# Patient Record
Sex: MALE | Race: WHITE
[De-identification: names, ages, dates, MRNs, and addresses within clinical notes are randomized per-mention and may not be internally consistent; named-entity substitution may affect disease eponyms.]

---

## 2017-01-08 ENCOUNTER — HOSPITAL ENCOUNTER (INPATIENT)
Dept: HOSPITAL 45 - C.EDB | Age: 22
LOS: 1 days | Discharge: HOME | DRG: 812 | End: 2017-01-09
Attending: HOSPITALIST | Admitting: HOSPITALIST
Payer: COMMERCIAL

## 2017-01-08 VITALS
SYSTOLIC BLOOD PRESSURE: 135 MMHG | OXYGEN SATURATION: 100 % | DIASTOLIC BLOOD PRESSURE: 82 MMHG | HEART RATE: 56 BPM | TEMPERATURE: 97.88 F

## 2017-01-08 VITALS
WEIGHT: 106.7 LBS | BODY MASS INDEX: 18.22 KG/M2 | WEIGHT: 106.7 LBS | HEIGHT: 64 IN | HEIGHT: 64 IN | BODY MASS INDEX: 18.22 KG/M2

## 2017-01-08 DIAGNOSIS — Z23: ICD-10-CM

## 2017-01-08 DIAGNOSIS — R10.12: ICD-10-CM

## 2017-01-08 DIAGNOSIS — D47.3: ICD-10-CM

## 2017-01-08 DIAGNOSIS — D57.00: Primary | ICD-10-CM

## 2017-01-08 DIAGNOSIS — R74.0: ICD-10-CM

## 2017-01-08 DIAGNOSIS — R94.5: ICD-10-CM

## 2017-01-08 DIAGNOSIS — E86.0: ICD-10-CM

## 2017-01-08 DIAGNOSIS — Z87.891: ICD-10-CM

## 2017-01-08 DIAGNOSIS — Z79.891: ICD-10-CM

## 2017-01-08 DIAGNOSIS — D72.829: ICD-10-CM

## 2017-01-08 DIAGNOSIS — D73.5: ICD-10-CM

## 2017-01-08 LAB
ALBUMIN/GLOB SERPL: 1 {RATIO} (ref 0.9–2)
ALP SERPL-CCNC: 100 U/L (ref 45–117)
ALT SERPL-CCNC: 64 U/L (ref 12–78)
ANION GAP SERPL CALC-SCNC: 10 MMOL/L (ref 3–11)
AST SERPL-CCNC: 115 U/L (ref 15–37)
BASOPHILS # BLD: 0.05 K/UL (ref 0–0.2)
BASOPHILS NFR BLD: 0.3 %
BUN SERPL-MCNC: 13 MG/DL (ref 7–18)
BUN/CREAT SERPL: 20.8 (ref 10–20)
CALCIUM SERPL-MCNC: 8.9 MG/DL (ref 8.5–10.1)
CHLORIDE SERPL-SCNC: 103 MMOL/L (ref 98–107)
CO2 SERPL-SCNC: 29 MMOL/L (ref 21–32)
COMPLETE: YES
CREAT CL PREDICTED SERPL C-G-VRATE: 122.9 ML/MIN
CREAT SERPL-MCNC: 0.61 MG/DL (ref 0.6–1.4)
EOSINOPHIL NFR BLD AUTO: 684 K/UL (ref 130–400)
GLOBULIN SER-MCNC: 4.1 GM/DL (ref 2.5–4)
GLUCOSE SERPL-MCNC: 92 MG/DL (ref 70–99)
HCT VFR BLD CALC: 30.5 % (ref 42–52)
HGB S BLD QL SOLY: (no result)
IG%: 0.4 %
IMM GRANULOCYTES NFR BLD AUTO: 9.7 %
LYMPHOCYTES # BLD: 1.55 K/UL (ref 1.2–3.4)
MCH RBC QN AUTO: 24.2 PG (ref 25–34)
MCHC RBC AUTO-ENTMCNC: 34.1 G/DL (ref 32–36)
MCV RBC AUTO: 70.9 FL (ref 80–100)
MICROCYTES BLD QL SMEAR: PRESENT
MONOCYTES NFR BLD: 8.9 %
NEUTROPHILS # BLD AUTO: 0.8 %
NEUTROPHILS NFR BLD AUTO: 79.9 %
PMV BLD AUTO: 9 FL (ref 7.4–10.4)
POLYCHROMASIA BLD QL SMEAR: (no result)
POTASSIUM SERPL-SCNC: 3.8 MMOL/L (ref 3.5–5.1)
RBC # BLD AUTO: 4.3 M/UL (ref 4.7–6.1)
SCHISTOCYTES BLD QL SMEAR: (no result)
SODIUM SERPL-SCNC: 142 MMOL/L (ref 136–145)
TARGETS BLD QL SMEAR: (no result)
WBC # BLD AUTO: 15.96 K/UL (ref 4.8–10.8)

## 2017-01-08 RX ADMIN — MORPHINE SULFATE PRN MG: 10 INJECTION, SOLUTION INTRAMUSCULAR; INTRAVENOUS at 23:23

## 2017-01-08 RX ADMIN — MORPHINE SULFATE PRN MG: 10 INJECTION, SOLUTION INTRAMUSCULAR; INTRAVENOUS at 19:12

## 2017-01-08 NOTE — HISTORY AND PHYSICAL
History & Physical


Date & Time of Service:


Jan 8, 2017 at 22:19


Chief Complaint:


Sickle Cell Crisis,Pain In Chest & Spleen


Primary Care Physician:


Jaime Lemons D.O.


History of Present Illness


Source:  patient, friend


20 y/o M Hx Sickle cell disease - recently D/Cd from a [place] hospital following 

a 5 day stay for painful crisis, anemia and what he was told was "splenic 

sequestration".  He was D/Cd in late December and then had visited an ER in 

[place] 01/02 with persistent pain.  He was D/Cd from the ER the same day.  His 

pain is mostly concentrated in his LUQ, is associated with nausea and worsened 

with deep inspiration.  A CT abdomen obtained in the ER reveals an expensively 

infarcted spleen.


The pt reports that he had been having QPM low-grade fevers while admitted in 

[place] but not since D/C.











We are unable to obtain records for the [facility] as the dept does not 

open on the lords day.





Past Medical/Surgical History


Medical Problems:


(1) Sickle cell anemia


Status: Chronic  











Family History





Diabetes mellitus


FH: heart disease


Hypertension


Kidney disease


Both parents with Sickle trait


Sibling with Sickle disease





Social History


Pt quit smoking 10/16


Does not drink alcohol


H[employer] from [employer] - studying engineering at [employer]


Smoking Status:  Never Smoker


Occupational Status:  [employer] student





Home Medications


Scheduled PRN


Acetaminophen/Codeine (Tylenol W/Codeine #3), 1 TAB PO UD PRN for Pain


Hydrocodone/Acetaminophen 10MG/325MG (Norco 10MG/325MG), 1 TAB PO TID PRN for 

Pain





Review of Systems


Constitutional:  No chills, No fever, No sweats


Eyes:  No worsening of vision


ENT:  No hearing loss, No nasal symptoms, No unusual epistaxis


Respiratory:  No cough, No sputum, No wheezing


Cardiovascular:  No PND, No chest pain, No orthopnea


Abdomen:  + nausea, + pain, No constipation, No diarrhea, No vomiting


Musculoskeletal:  No joint pain, No muscle pain


Genitourinary - Male:  No dysuria, No hematuria, No urinary frequency


Neurologic:  No memory loss, No paralysis


Psychiatric:  No depression symptoms


Endocrine:  No fatigue


Hematologic / Lymphatic:  No abnormal bleeding/bruising


Integumentary:  No rash


Allergic / Immunologic:  No environmental allergies, No seasonal allergies





Physical Exam


Vital Signs











  Date Time  Temp Pulse Resp B/P Pulse Ox O2 Delivery O2 Flow Rate FiO2


 


1/8/17 21:42 36.9       


 


1/8/17 21:32  74 16 105/69 100   


 


1/8/17 18:36 37.2 100 20 116/63 98 Room Air  








General Appearance:  WD/WN, + pertinent finding (Thinyoung male - non acute 

distress - appears uncomfortable)


Head:  normocephalic, atraumatic


Eyes:  normal inspection, PERRL


ENT:  normal ENT inspection, pharynx normal


Neck:  supple, no JVD


Respiratory/Chest:  chest non-tender, lungs clear, normal breath sounds


Cardiovascular:  regular rate, rhythm, no edema, no gallop, no JVD


Abdomen/GI:  normal bowel sounds, non tender, soft


Back:  normal inspection, no CVA tenderness, no muscle spasm, normal range of 

motion


Extremities/Musculoskelatal:  normal inspection, no calf tenderness, normal 

capillary refill, no pedal edema, normal range of motion


Neurologic/Psych:  CNs II-XII nml as tested, no motor/sensory deficits, alert, 

normal mood/affect, normal reflexes, oriented x 3


Skin:  normal color, warm/dry, no rash





Diagnostics


Laboratory Results





Results Past 24 Hours








Test


  1/8/17


19:00 Range/Units


 


 


White Blood Count 15.96 4.8-10.8  K/uL


 


Red Blood Count 4.30 4.7-6.1  M/uL


 


Hemoglobin 10.4 14.0-18.0  g/dL


 


Hematocrit 30.5 42-52  %


 


Mean Corpuscular Volume 70.9   fL


 


Mean Corpuscular Hemoglobin 24.2 25-34  pg


 


Mean Corpuscular Hemoglobin


Concent 34.1


  32-36  g/dl


 


 


Platelet Count 684 130-400  K/uL


 


Mean Platelet Volume 9.0 7.4-10.4  fL


 


Neutrophils (%) (Auto) 79.9  %


 


Lymphocytes (%) (Auto) 9.7  %


 


Monocytes (%) (Auto) 8.9  %


 


Eosinophils (%) (Auto) 0.8  %


 


Basophils (%) (Auto) 0.3  %


 


Neutrophils # (Auto) 12.75 1.4-6.5  K/uL


 


Lymphocytes # (Auto) 1.55 1.2-3.4  K/uL


 


Monocytes # (Auto) 1.42 0.11-0.59  K/uL


 


Eosinophils # (Auto) 0.12 0-0.5  K/uL


 


Basophils # (Auto) 0.05 0-0.2  K/uL


 


RDW Standard Deviation 53.2 36.4-46.3  fL


 


RDW Coefficient of Variation 20.7 11.5-14.5  %


 


Immature Granulocyte % (Auto) 0.4  %


 


Immature Granulocyte # (Auto) 0.07 0.00-0.02  K/uL


 


Polychromasia 1+  


 


Microcytosis PRESENT  


 


Sickle Cells OCCASIONAL  


 


Target Cells 2+  


 


Schistocytes OCCASIONAL  


 


Absolute Reticulocyte Count


  0.17


  0.02-0.10


10^6/uL


 


Percent Reticulocyte Count 4.2 0.5-2.0  %


 


Sodium Level 142 136-145  mmol/L


 


Potassium Level 3.8 3.5-5.1  mmol/L


 


Chloride Level 103   mmol/L


 


Carbon Dioxide Level 29 21-32  mmol/L


 


Anion Gap 10.0 3-11  mmol/L


 


Blood Urea Nitrogen 13 7-18  mg/dl


 


Creatinine


  0.61


  0.60-1.40


mg/dl


 


Est Creatinine Clear Calc


Drug Dose 122.9


   ml/min


 


 


Estimated GFR (


American) > 150.0


   


 


 


Estimated GFR (Non-


American 144.8


   


 


 


BUN/Creatinine Ratio 20.8 10-20  


 


Random Glucose 92 70-99  mg/dl


 


Calcium Level 8.9 8.5-10.1  mg/dl


 


Total Bilirubin 2.7 0.2-1  mg/dl


 


Aspartate Amino Transf


(AST/SGOT) 115


  15-37  U/L


 


 


Alanine Aminotransferase


(ALT/SGPT) 64


  12-78  U/L


 


 


Alkaline Phosphatase 100   U/L


 


Total Protein 8.1 6.4-8.2  gm/dl


 


Albumin 4.0 3.4-5.0  gm/dl


 


Globulin 4.1 2.5-4.0  gm/dl


 


Albumin/Globulin Ratio 1.0 0.9-2  


 


Lipase 161   U/L


 


Chemistry Specimen Hemolysis   








 Microbiology Results


1/8/17 Blood Culture, Received


         Pending


1/8/17 Blood Culture, Received


         Pending





Diagnostic Radiology


Abdominal CT





1.  Extensive splenic infarction, involving the majority of the spleen. 

Moderate splenomegaly. No perisplenic fluid.





2. Distended bladder.





3. Borderline cardiomegaly.





Impression


Assessment and Plan


20 y/o M Hx Sickle cell disease - recently D/Cd from a [facility] following 

a 5 day stay for painful crisis, anemia and what he was told was "splenic 

sequestration".  He was D/Cd in late December and then had visited an ER in 

[place] 01/02 with persistent pain.  He was D/Cd from the ER the same day.  His 

pain is mostly concentrated in his LUQ, is associated with nausea and worsened 

with deep inspiration.  A CT abdomen obtained in the ER reveals an extensively 

infarcted spleen.


The pt reports that he had been having QPM low-grade fevers while admitted in 

[place] but not since D/C.





1) Splenic infarct - Pts pain is localized and therefore his pain may be more 

related to the above infarct rather than crisis at present - we will provide 

narcotics and IVF in addition to LMH





2) Sickle julio disease - possible crisis - We will treat as presumed crisis 

regardless as treatment would not vary much from management of a splenic 

infarct.  IFV, narcotics and LMH are provided.  This pt should likely be on 

Hydroxyuria as an outpt - we have consulted H/O and will leave this to their 

discretion. 


He was ast transfused 1 unit PRBCs late December when he reported a Hb of 8 

when hospitalized - anemia is modest at present.





- The pt is concerned both with developing narcotic addiction and missing 

school time - if we can adequately control his pain there may not be a need for 

prolonged hospitalization.  An effort should be made to switch to non-narcotic 

anelgesics at the earliest possible time although he is currently dependent for 

his pain.














Total time for this admit including discussion with ER MD - review of labs, 

records, imaging - exam and discussion with pt - 35 min


Full Code - Heparin prophylaxis





Level of Care


Telemetry





VTE Prophylaxis


VTE Risk Assessment Done? Y/N:  Yes


Risk Level:  High


Given or contraindicated:  Unfractionated heparin SQ

## 2017-01-08 NOTE — DIAGNOSTIC IMAGING REPORT
******** ADDENDUM ********





Addendum: Mild groundglass opacities within the lower lobes, left greater than

right, and a trace left pleural effusion are noted. The appearance favors

atelectasis although could be seen in the setting of acute chest syndrome given

the clinical history.







Electronically signed by:  David Riojas M.D.

1/8/2017 9:29 PM



Dictated Date/Time:  1/8/2017 9:28 PM



******** ORIGINAL REPORT ********





CT ABD WITH IV CONTRAST ONLY (CT)



CT DOSE: 174.45 mGy.cm



CLINICAL HISTORY: Left-sided abdominal pain. Sickle cell crisis.    



TECHNIQUE: Axial images of the abdomen were obtained following intravenous

injection of 119 cc Optiray 320 IV.



COMPARISON STUDY:  Left upper quadrant ultrasound January 8, 2017.



FINDINGS: Visualized portions of the lower chest demonstrate groundglass opacity

with the left lower lobe suggestive of atelectasis. There is borderline

cardiomegaly. The liver, adrenal glands, kidneys and pancreas are unremarkable.

There is no biliary or pancreatic ductal dilatation. No hepatic lesions are

identified. There is moderate splenomegaly. The spleen measures 17 cm in

craniocaudal dimension. The appearance of the spleen is markedly abnormal. The

majority of the spleen demonstrates no significant enhancement suggestive of

extensive splenic infarction. There is enhancement of approximately 30-40% of

the spleen. There is no perisplenic fluid. The bladder is only partially imaged

but is distended. Caliber and wall thickness of visualized small and large bowel

are normal. No free air is present within the abdomen. No suspicious skeletal

lesions are identified. There is slight deformity of the endplates, a common

finding in the setting of sickle cell disease. The vertebral bodies are slightly

H shaped.





IMPRESSION:  



1.  Extensive splenic infarction, involving the majority of the spleen. Moderate

splenomegaly. No perisplenic fluid.



2. Distended bladder.



3. Borderline cardiomegaly.







Electronically signed by:  David Riojas M.D.

1/8/2017 9:11 PM



Dictated Date/Time:  1/8/2017 8:59 PM

## 2017-01-08 NOTE — DIAGNOSTIC IMAGING REPORT
CHEST ONE VIEW PORTABLE



CLINICAL HISTORY: Sickle cell crisis. Abdominal pain.    



COMPARISON STUDY:  No previous studies for comparison.



FINDINGS: Lung volumes are mildly diminished. Linear left basilar opacity is

suggestive of atelectasis. Right lung is clear. There is suspected mild

enlargement of the cardiac silhouette. Pulmonary vascularity is normal. 



IMPRESSION:  



1. Linear left basilar opacity which favors atelectasis.



2. Mild enlargement of the cardiac silhouette. 









Electronically signed by:  David Riojas M.D.

1/8/2017 7:10 PM



Dictated Date/Time:  1/8/2017 7:09 PM

## 2017-01-08 NOTE — EMERGENCY ROOM VISIT NOTE
History


Report prepared by Alisson:  Jim Hardy


Under the Supervision of:  Dr. Nura Cummings M.D.


First contact with patient:  18:40


Chief Complaint:  ILLNESS


Stated Complaint:  SICKLE CELL CRISIS,PAIN IN CHEST & SPLEEN





History of Present Illness


The patient is a 21 year old male with a history of sickle cell disease who 

presents to the Emergency Room with complaints of waxing & waning left upper 

quadrant pain for the past month. The patient's pain is currently rated 6/10 in 

severity. The patient had some relief from Norco, which he had PTA. The patient 

also feels short of breath. He denies any fevers, vomiting, or problems 

associated with bowel movements and urination. The patient experienced a sickle 

cell crisis approximately one month ago when he flew from [place] to 

[place]. He was admitted to the hospital in [place] for one week. The patient's 

left upper quadrant pain at that time was 8/10, and he was also vomiting. The 

patient was diagnosed with splenomegaly by ultrasound. He also had a blood 

transfusion while he was in the hospital. He was given a prescription for 

Norco. The patient flew back to [place] from [place] yesterday. He feels 

like he is having another sickle cell crisis. His pain has never fully resolved 

since the crisis one month ago. The [facility]





   Source of History:  patient


   Onset:  one month


   Position:  abdomen (LUQ)


   Symptom Intensity:  6/10


   Timing:  waxes/wanes


   Modifying Factors (Relieving):  narcotics


   Associated Symptoms:  + SOB, No fevers, No urinary symptoms, No vomiting





Review of Systems


See HPI for pertinent positives & negatives. A total of 10 systems reviewed and 

were otherwise negative.





Past Medical & Surgical


Medical Problems:


(1) Sickle cell anemia








Family History





Diabetes mellitus


FH: heart disease


Hypertension


Kidney disease





Social History


Smoking Status:  Never Smoker


Occupation Status:  [employer] student





Current/Historical Medications


Scheduled PRN


Acetaminophen/Codeine (Tylenol W/Codeine #3), 1 TAB PO UD PRN for Pain


Hydrocodone/Acetaminophen 10MG/325MG (Norco 10MG/325MG), 1 TAB PO TID PRN for 

Pain





Physical Exam


Vital Signs











  Date Time  Temp Pulse Resp B/P Pulse Ox O2 Delivery O2 Flow Rate FiO2


 


1/8/17 21:32  74 16 105/69 100   


 


1/8/17 18:36 37.2 100 20 116/63 98 Room Air  











Physical Exam


GENERAL: Patient is in no acute distress.


HEENT: No acute trauma, normocephalic atraumatic, mucous membranes moist, no 

nasal congestion, no scleral icterus.


NECK: No stridor, no adenopathy, no meningismus, trachea is midline.


LUNGS: Clear to auscultation bilaterally, no wheeze, no rhonchi, breath sounds 

equal.


HEART: 2/6 systolic murmur, mild tachycardia, regular rhythm.


ABDOMEN: Soft, left upper quadrant tenderness, bowel sounds positive, no hernias

, no peritonitis.


EXTREMITIES: No cyanosis or edema, full range of motion of all the joints 

without pain or difficulty, no signs for acute trauma.


NEUROLOGIC: Oriented x 3, no acute motor or sensory deficits, no focal weakness.


SKIN: No rash, no jaundice, no diaphoresis.





Medical Decision & Procedures


ER Provider


Diagnostic Interpretation:


X ray results and stated below per my interpretation and radiologist 

interpretation. Other radiology results and stated below per my review and 

radiologist interpretation:





CHEST ONE VIEW PORTABLE





CLINICAL HISTORY: Sickle cell crisis. Abdominal pain.    





COMPARISON STUDY:  No previous studies for comparison.





FINDINGS: Lung volumes are mildly diminished. Linear left basilar opacity is


suggestive of atelectasis. Right lung is clear. There is suspected mild


enlargement of the cardiac silhouette. Pulmonary vascularity is normal. 





IMPRESSION:  





1. Linear left basilar opacity which favors atelectasis.





2. Mild enlargement of the cardiac silhouette. 








Electronically signed by:  David Riojas M.D.


1/8/2017 7:10 PM





Dictated Date/Time:  1/8/2017 7:09 PM








LEFT UPPER QUADRANT ULTRASOUND





HISTORY:     Sickle cell disease with left upper quadrant pain.





COMPARISON:  None.





FINDINGS: The spleen is moderately enlarged, measuring 17.9 cm in maximal


diameter. The spleen is markedly heterogeneous in appearance. There are multiple


areas of decreased and increased echogenicity about the spleen. There is no


perisplenic fluid. Color flow is identified within the spleen although in areas,


is less than expected.





IMPRESSION: 





Moderately enlarged, markedly heterogeneous spleen with areas of increased and


decreased echogenicity. The findings could reflect infarcts but are not classic


for splenic infarcts. No perisplenic fluid. A contrast-enhanced CT of the


abdomen is recommended for further evaluation. Discussed with Dr. Cummings at time


of dictation.








Electronically signed by:  David Riojas M.D.


1/8/2017 8:19 PM





Dictated Date/Time:  1/8/2017 7:59 PM








CT ABD WITH IV CONTRAST ONLY (CT)





CT DOSE: 174.45 mGy.cm





CLINICAL HISTORY: Left-sided abdominal pain. Sickle cell crisis.    





TECHNIQUE: Axial images of the abdomen were obtained following intravenous


injection of 119 cc Optiray 320 IV.





COMPARISON STUDY:  Left upper quadrant ultrasound January 8, 2017.





FINDINGS: Visualized portions of the lower chest demonstrate groundglass opacity


with the left lower lobe suggestive of atelectasis. There is borderline


cardiomegaly. The liver, adrenal glands, kidneys and pancreas are unremarkable.


There is no biliary or pancreatic ductal dilatation. No hepatic lesions are


identified. There is moderate splenomegaly. The spleen measures 17 cm in


craniocaudal dimension. The appearance of the spleen is markedly abnormal. The


majority of the spleen demonstrates no significant enhancement suggestive of


extensive splenic infarction. There is enhancement of approximately 30-40% of


the spleen. There is no perisplenic fluid. The bladder is only partially imaged


but is distended. Caliber and wall thickness of visualized small and large bowel


are normal. No free air is present within the abdomen. No suspicious skeletal


lesions are identified. There is slight deformity of the endplates, a common


finding in the setting of sickle cell disease. The vertebral bodies are slightly


H shaped.








IMPRESSION:  





1.  Extensive splenic infarction, involving the majority of the spleen. Moderate


splenomegaly. No perisplenic fluid.





2. Distended bladder.





3. Borderline cardiomegaly.











Electronically signed by:  David Riojas M.D.


1/8/2017 9:11 PM





Dictated Date/Time:  1/8/2017 8:59 PM





Laboratory Results


1/8/17 19:00








Red Blood Count 4.30, Mean Corpuscular Volume 70.9, Mean Corpuscular Hemoglobin 

24.2, Mean Corpuscular Hemoglobin Concent 34.1, Mean Platelet Volume 9.0, 

Neutrophils (%) (Auto) 79.9, Lymphocytes (%) (Auto) 9.7, Monocytes (%) (Auto) 

8.9, Eosinophils (%) (Auto) 0.8, Basophils (%) (Auto) 0.3, Neutrophils # (Auto) 

12.75, Lymphocytes # (Auto) 1.55, Monocytes # (Auto) 1.42, Eosinophils # (Auto) 

0.12, Basophils # (Auto) 0.05





1/8/17 19:00

















Test


  1/8/17


19:00


 


White Blood Count


  15.96 K/uL


(4.8-10.8)


 


Red Blood Count


  4.30 M/uL


(4.7-6.1)


 


Hemoglobin


  10.4 g/dL


(14.0-18.0)


 


Hematocrit 30.5 % (42-52) 


 


Mean Corpuscular Volume


  70.9 fL


()


 


Mean Corpuscular Hemoglobin


  24.2 pg


(25-34)


 


Mean Corpuscular Hemoglobin


Concent 34.1 g/dl


(32-36)


 


Platelet Count


  684 K/uL


(130-400)


 


Mean Platelet Volume


  9.0 fL


(7.4-10.4)


 


Neutrophils (%) (Auto) 79.9 % 


 


Lymphocytes (%) (Auto) 9.7 % 


 


Monocytes (%) (Auto) 8.9 % 


 


Eosinophils (%) (Auto) 0.8 % 


 


Basophils (%) (Auto) 0.3 % 


 


Neutrophils # (Auto)


  12.75 K/uL


(1.4-6.5)


 


Lymphocytes # (Auto)


  1.55 K/uL


(1.2-3.4)


 


Monocytes # (Auto)


  1.42 K/uL


(0.11-0.59)


 


Eosinophils # (Auto)


  0.12 K/uL


(0-0.5)


 


Basophils # (Auto)


  0.05 K/uL


(0-0.2)


 


RDW Standard Deviation


  53.2 fL


(36.4-46.3)


 


RDW Coefficient of Variation


  20.7 %


(11.5-14.5)


 


Immature Granulocyte % (Auto) 0.4 % 


 


Immature Granulocyte # (Auto)


  0.07 K/uL


(0.00-0.02)


 


Polychromasia 1+ 


 


Microcytosis PRESENT 


 


Sickle Cells OCCASIONAL 


 


Target Cells 2+ 


 


Schistocytes OCCASIONAL 


 


Absolute Reticulocyte Count


  0.17 10^6/uL


(0.02-0.10)


 


Percent Reticulocyte Count


  4.2 %


(0.5-2.0)


 


Anion Gap


  10.0 mmol/L


(3-11)


 


Est Creatinine Clear Calc


Drug Dose 122.9 ml/min 


 


 


Estimated GFR (


American) > 150.0 


 


 


Estimated GFR (Non-


American 144.8 


 


 


BUN/Creatinine Ratio 20.8 (10-20) 


 


Calcium Level


  8.9 mg/dl


(8.5-10.1)


 


Total Bilirubin


  2.7 mg/dl


(0.2-1)


 


Aspartate Amino Transf


(AST/SGOT) 115 U/L


(15-37)


 


Alanine Aminotransferase


(ALT/SGPT) 64 U/L (12-78) 


 


 


Alkaline Phosphatase


  100 U/L


()


 


Total Protein


  8.1 gm/dl


(6.4-8.2)


 


Albumin


  4.0 gm/dl


(3.4-5.0)


 


Globulin


  4.1 gm/dl


(2.5-4.0)


 


Albumin/Globulin Ratio 1.0 (0.9-2) 


 


Lipase


  161 U/L


()


 


Chemistry Specimen Hemolysis  





Laboratory results reviewed by me.





Medications Administered











 Medications


  (Trade)  Dose


 Ordered  Sig/Nicolas


 Route  Start Time


 Stop Time Status Last Admin


Dose Admin


 


 Ondansetron HCl 4


 mg  4 mg  NOW  STAT


 IV  1/8/17 18:48


 1/8/17 18:51 DC 1/8/17 19:11


4 MG


 


 Sodium Chloride


  (Nss 1000ml)  1,000 ml @ 


 999 mls/hr  Q1H1M STAT


 IV  1/8/17 18:48


 1/8/17 19:48 DC 1/8/17 19:12


999 MLS/HR


 


 Morphine Sulfate


  (MoRPHine


 SULFATE INJ)  6 mg  Q30M  PRN


 IV  1/8/17 19:00


 1/22/17 18:59  1/8/17 19:12


6 MG











ED Course


1840: The patient was evaluated in room C8. A complete history and physical 

exam was performed.





1848: NSS 1000 ml @ 999 mls/hr, Zofran 4 mg IV.





1900: Morphine Sulfate 6 mg IV.





1932: Discussed the case with Dr. Diaz, Hematologist. He recommends the 

patient be hospitalized. Pain control and IV hydration in the meantime. He 

requested blood cultures and a repeat hemoglobin in the morning.





2008: Dr. Riojas of radiology requested a CT scan of the spleen.





2120: Spoke with Dr. Hull, [facility] Hospitalist. The patient will be 

evaluated.





Medical Decision


Differential diagnosis includes splenic rupture or infarct, sickle cell crisis, 

pneumonia, dehydration, electrolyte imbalance, anemia.





There is a moderate leukocytosis which could be consistent with infection or 

just his pain.  Hemoglobin is low but as per his significant other, this is 

higher than it was when he was in the hospital in [place].  There is no 

significant electrolyte abnormality or kidney failure.  There were some very 

subtle elevations to a few of the LFTs.  Chest x-ray shows some presumed 

atelectasis at the left base, no pneumothorax or pneumonia, no mediastinal 

widening or free air.  Splenic ultrasound showed an enlarged spleen with 

possible infarction versus rupture.  A CT of the spleen was recommended.  

Abdominal and pelvis CT demonstrates a large splenic infarct, no evidence for 

splenic rupture.





The patient received IV saline, IV morphine and IV Zofran.  He is comfortable, 

he seems stable.





I spoke to the patient and to the hematology oncology department.  Admission/

observation was recommended.  No emergent intervention this evening.  I talked 

to the on-call hospitalist and case management.  The patient is aware of all 

his findings.





Consults


Time Called:  1925


Consulting Physician:  Dr. Diaz, Hematologist


Returned Call:  [phone]: Discussed the case with Dr. Diaz, Hematologist. He recommends the 

patient be hospitalized. Pain control and IV hydration in the meantime. He 

requested blood cultures and a repeat hemoglobin in the morning.


Additional Consults:  


   Time Called:  2120


   Consulted Physician:  Dr. Hull, [facility] Hospitalist


   Returned Call:  [phone]


Additional Comments:


2123: Spoke with Dr. Hull, [facility]. The patient will be 

evaluated.





Impression





 Primary Impression:  Splenic infarct


 Additional Impressions:  LUQ abdominal pain, Sickle cell crisis





Scribe Attestation


The scribe's documentation has been prepared under my direction and personally 

reviewed by me in its entirety. I confirm that the note above accurately 

reflects all work, treatment, procedures, and medical decision making performed 

by me.





Departure Information


Dispostion


Being Evaluated By Hospitalist





Referrals


Jaime Lemons D.O. (PCP)





Patient Instructions


A Signature Page, My [facility]

## 2017-01-08 NOTE — DIAGNOSTIC IMAGING REPORT
LEFT UPPER QUADRANT ULTRASOUND



HISTORY:     Sickle cell disease with left upper quadrant pain.



COMPARISON:  None.



FINDINGS: The spleen is moderately enlarged, measuring 17.9 cm in maximal

diameter. The spleen is markedly heterogeneous in appearance. There are multiple

areas of decreased and increased echogenicity about the spleen. There is no

perisplenic fluid. Color flow is identified within the spleen although in areas,

is less than expected.



IMPRESSION: 



Moderately enlarged, markedly heterogeneous spleen with areas of increased and

decreased echogenicity. The findings could reflect infarcts but are not classic

for splenic infarcts. No perisplenic fluid. A contrast-enhanced CT of the

abdomen is recommended for further evaluation. Discussed with Dr. Cummings at time

of dictation.







Electronically signed by:  David Riojas M.D.

1/8/2017 8:19 PM



Dictated Date/Time:  1/8/2017 7:59 PM

## 2017-01-09 VITALS
HEART RATE: 84 BPM | SYSTOLIC BLOOD PRESSURE: 108 MMHG | TEMPERATURE: 98.42 F | OXYGEN SATURATION: 97 % | DIASTOLIC BLOOD PRESSURE: 65 MMHG

## 2017-01-09 VITALS
OXYGEN SATURATION: 99 % | DIASTOLIC BLOOD PRESSURE: 63 MMHG | SYSTOLIC BLOOD PRESSURE: 95 MMHG | TEMPERATURE: 97.7 F | HEART RATE: 70 BPM

## 2017-01-09 VITALS
OXYGEN SATURATION: 98 % | DIASTOLIC BLOOD PRESSURE: 70 MMHG | SYSTOLIC BLOOD PRESSURE: 117 MMHG | TEMPERATURE: 97.88 F | HEART RATE: 53 BPM

## 2017-01-09 VITALS
TEMPERATURE: 98.06 F | SYSTOLIC BLOOD PRESSURE: 87 MMHG | OXYGEN SATURATION: 96 % | DIASTOLIC BLOOD PRESSURE: 47 MMHG | HEART RATE: 54 BPM

## 2017-01-09 VITALS
OXYGEN SATURATION: 97 % | TEMPERATURE: 98.42 F | HEART RATE: 84 BPM | DIASTOLIC BLOOD PRESSURE: 65 MMHG | SYSTOLIC BLOOD PRESSURE: 108 MMHG

## 2017-01-09 VITALS — OXYGEN SATURATION: 100 %

## 2017-01-09 VITALS — DIASTOLIC BLOOD PRESSURE: 72 MMHG | SYSTOLIC BLOOD PRESSURE: 109 MMHG

## 2017-01-09 LAB
ALP SERPL-CCNC: 92 U/L (ref 45–117)
ALT SERPL-CCNC: 114 U/L (ref 12–78)
ANION GAP SERPL CALC-SCNC: 10 MMOL/L (ref 3–11)
AST SERPL-CCNC: 120 U/L (ref 15–37)
BUN SERPL-MCNC: 7 MG/DL (ref 7–18)
BUN/CREAT SERPL: 11.7 (ref 10–20)
CALCIUM SERPL-MCNC: 8.4 MG/DL (ref 8.5–10.1)
CHLORIDE SERPL-SCNC: 108 MMOL/L (ref 98–107)
CO2 SERPL-SCNC: 25 MMOL/L (ref 21–32)
CREAT CL PREDICTED SERPL C-G-VRATE: 140.2 ML/MIN
CREAT SERPL-MCNC: 0.58 MG/DL (ref 0.6–1.4)
EOSINOPHIL NFR BLD AUTO: 532 K/UL (ref 130–400)
GLUCOSE SERPL-MCNC: 87 MG/DL (ref 70–99)
HCT VFR BLD CALC: 28 % (ref 42–52)
INR PPP: 1.1 (ref 0.9–1.1)
MAGNESIUM SERPL-MCNC: 1.8 MG/DL (ref 1.8–2.4)
MCH RBC QN AUTO: 24.1 PG (ref 25–34)
MCHC RBC AUTO-ENTMCNC: 34.3 G/DL (ref 32–36)
MCV RBC AUTO: 70.4 FL (ref 80–100)
PMV BLD AUTO: 8.8 FL (ref 7.4–10.4)
POTASSIUM SERPL-SCNC: 4.1 MMOL/L (ref 3.5–5.1)
PROTHROMBIN TIME: 11.3 SECONDS (ref 9–12)
RBC # BLD AUTO: 3.98 M/UL (ref 4.7–6.1)
SODIUM SERPL-SCNC: 143 MMOL/L (ref 136–145)
WBC # BLD AUTO: 8.29 K/UL (ref 4.8–10.8)

## 2017-01-09 RX ADMIN — HEPARIN SODIUM SCH UNIT: 10000 INJECTION, SOLUTION INTRAVENOUS; SUBCUTANEOUS at 14:00

## 2017-01-09 RX ADMIN — DEXTROSE MONOHYDRATE, SODIUM CHLORIDE, AND POTASSIUM CHLORIDE SCH MLS/HR: 50; 9; 1.49 INJECTION, SOLUTION INTRAVENOUS at 07:59

## 2017-01-09 RX ADMIN — HEPARIN SODIUM SCH UNIT: 10000 INJECTION, SOLUTION INTRAVENOUS; SUBCUTANEOUS at 05:12

## 2017-01-09 RX ADMIN — DEXTROSE MONOHYDRATE, SODIUM CHLORIDE, AND POTASSIUM CHLORIDE SCH MLS/HR: 50; 9; 1.49 INJECTION, SOLUTION INTRAVENOUS at 00:11

## 2017-01-09 NOTE — ONCOLOGY CONSULTATION
Oncology/Heme Consultation


Date of Consultation:


Jan 9, 2017.


Attending Physician:


Efren Hendricks D.O.


Reason for Consultation:


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


History of Present Illness


Mr. 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 He has a history of a 

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 

fetal hemoglobin. His symptoms of vasomotor occlusion have been infrequent but 

he does review with me that he has had to be hospitalized for pain control in 

the past. He is from [facility].  As reviewed in the history and physical following 

the end of classes here he traveled to [facility]. It was on the plane flight 

to [place] that he developed a left upper quadrant pain. He was hospitalized in 

[place]. He is a good historian. He states that he was told his spleen was 

infarcted and he did receive a blood transfusion. Following that he has 

returned for the beginning of classes here with this semester. After his plane 

flight yesterday the left upper quadrant pain continued and he is admitted. He 

denies any fever or chills. He has not seen any blood in his stools. The pain 

this morning was graded at about 2.0.





Past Medical/Surgical History


Medical Problems:


(1) LUQ abdominal pain


Status: Acute  





(2) Sickle cell crisis


Status: Acute  





(3) Splenic infarct


Status: Acute  








Family History





Diabetes mellitus


FH: heart disease


Hypertension


Kidney disease


His brother also has a variant of hemoglobin S from what the patient can recall





Social History


Negative for significant smoking or alcohol usage


Smoking Status:  Current Every Day Smoker


Occupation Status:  [employer] student





Allergies


Coded Allergies:  


     No Known Allergies (Unverified , 1/8/17)





Home Medications


Scheduled PRN


Acetaminophen/Codeine (Tylenol W/Codeine #3), 1 TAB PO UD PRN for Pain


Hydrocodone/Acetaminophen 10MG/325MG (Norco 10MG/325MG), 1 TAB PO TID PRN for 

Pain





Current Inpatient Medications





 Current Inpatient Medications








 Medications


  (Trade)  Dose


 Ordered  Sig/Nicolas


 Route  Start Time


 Stop Time Status Last Admin


Dose Admin


 


 Ioversol


  (Optiray 320)  100 ml  UD  PRN


 IV  1/8/17 20:15


 1/12/17 20:14   


 


 


 Hydromorphone HCl


  (Dilaudid Inj)  1 mg  Q3H  PRN


 IV  1/8/17 22:15


 1/22/17 22:14  1/8/17 23:39


1 MG


 


 Oxycodone/


 Acetaminophen


  (Percocet


 10-325MG Tab)  1 tab  Q4H  PRN


 PO  1/8/17 22:15


 1/22/17 22:14   


 


 


 Heparin Sodium


  (Porcine) 5000


 unit  5,000 unit  Q8


 SQ  1/9/17 06:00


 2/8/17 05:59   


 


 


 Potassium


 Chloride/Dextrose/


 Sod Cl


  (D5nss + 20meq


 KCl)  1,000 ml @ 


 150 mls/hr  Q6H40M


 IV  1/8/17 23:45


 1/9/17 13:04  1/9/17 07:59


150 MLS/HR











Review of Systems


Constitutional:  Negative for weight loss, night sweats, or fever


Eyes:  Negative for event change of vision


ENT:  Negative for epistaxis, nasal discharge, sore throat, or deafness


Cardiovascular:  Negative for chest pain, palpitations, dizziness, diaphoresis


Respiratory:  Negative for new shortness of breath,hemoptysis, or purulent cough


Gastrointestinal:  Negative for diarrhea, hematemesis, melena, nausea, vomiting

, or dyspepsia.  He has had a left upper quadrant pain as stated.


Integumentary (skin):  Negative for rash or jaundice discoloration


Lymphatic/Hematologic:  Negative for petechiae, bleeding or new adenopathy


Musculoskeletal:  Negative for new joint or back pain


Allergic/Immunologic:  Negative for unusual rash or pruritis.





Physical Exam











  Date Time  Temp Pulse Resp B/P Pulse Ox O2 Delivery O2 Flow Rate FiO2


 


1/9/17 08:06 36.7 54 17 87/47 96 Room Air  


 


1/9/17 08:00      Room Air  


 


1/9/17 04:33     100 Room Air  


 


1/9/17 02:58 36.6 53 18 117/70 98 Room Air  


 


1/9/17 00:05     100 Room Air  


 


1/8/17 23:15 36.6 56 16 135/82 100 Room Air  


 


1/8/17 22:31  69      


 


1/8/17 21:42 36.9       


 


1/8/17 21:32  74 16 105/69 100   


 


1/8/17 18:36 37.2 100 20 116/63 98 Room Air  








Constitutional: vitals are stable. Thin pleasant gentleman oriented 3


Eyes: Eyes are BRENT EOMI without conjuctival erythema or icterus.


ENT: External examination was negative for masses.


Neck: Negative for masses or palpable thyromegaly


Respiratory:  Lung sounds were generally clear bilaterally


Cardiovascular: Heart was RRR without significant murmur, gallops aoe rubs


Gastrointestinal: No palpable hepaticomegaly.  The left upper quadrant was 

palpated. I believe we can feel a spleen tip that extends below the left costal 

margin at least 2-3 cm. It is very soft. Percussion and palpation were kept to 

a limit..


Lymphatic system:  there was no palpable peripheral lymphadenopathy


Musculoskeletal System:  The musculoskeletal system seemed concordant with age.


Skin: The skin was negative for jaundice.


Neurologic exam: The exam was negative for any focal findings.  Deep tendon 

reflexes were equal and symmetrical.


Psychiatric exam: Was essentially negative with normal mood and effect.


Extremities: Negative for edema erythema





Laboratory Results





Last 24 Hours








Test


  1/8/17


19:00 1/9/17


06:55


 


White Blood Count 15.96 K/uL  8.29 K/uL 


 


Red Blood Count 4.30 M/uL  3.98 M/uL 


 


Hemoglobin 10.4 g/dL  9.6 g/dL 


 


Hematocrit 30.5 %  28.0 % 


 


Mean Corpuscular Volume 70.9 fL  70.4 fL 


 


Mean Corpuscular Hemoglobin 24.2 pg  24.1 pg 


 


Mean Corpuscular Hemoglobin


Concent 34.1 g/dl 


  34.3 g/dl 


 


 


Platelet Count 684 K/uL  532 K/uL 


 


Mean Platelet Volume 9.0 fL  8.8 fL 


 


Neutrophils (%) (Auto) 79.9 %  


 


Lymphocytes (%) (Auto) 9.7 %  


 


Monocytes (%) (Auto) 8.9 %  


 


Eosinophils (%) (Auto) 0.8 %  


 


Basophils (%) (Auto) 0.3 %  


 


Neutrophils # (Auto) 12.75 K/uL  


 


Lymphocytes # (Auto) 1.55 K/uL  


 


Monocytes # (Auto) 1.42 K/uL  


 


Eosinophils # (Auto) 0.12 K/uL  


 


Basophils # (Auto) 0.05 K/uL  


 


RDW Standard Deviation 53.2 fL  51.7 fL 


 


RDW Coefficient of Variation 20.7 %  20.4 % 


 


Immature Granulocyte % (Auto) 0.4 %  


 


Immature Granulocyte # (Auto) 0.07 K/uL  


 


Polychromasia 1+  


 


Microcytosis PRESENT  


 


Sickle Cells OCCASIONAL  


 


Target Cells 2+  


 


Schistocytes OCCASIONAL  


 


Absolute Reticulocyte Count 0.17 10^6/uL  


 


Percent Reticulocyte Count 4.2 %  


 


Sodium Level 142 mmol/L  143 mmol/L 


 


Potassium Level 3.8 mmol/L  4.1 mmol/L 


 


Chloride Level 103 mmol/L  108 mmol/L 


 


Carbon Dioxide Level 29 mmol/L  25 mmol/L 


 


Anion Gap 10.0 mmol/L  10.0 mmol/L 


 


Blood Urea Nitrogen 13 mg/dl  7 mg/dl 


 


Creatinine 0.61 mg/dl  0.58 mg/dl 


 


Est Creatinine Clear Calc


Drug Dose 122.9 ml/min 


  140.2 ml/min 


 


 


Estimated GFR (


American) > 150.0 


  > 150.0 


 


 


Estimated GFR (Non-


American 144.8 


  147.8 


 


 


BUN/Creatinine Ratio 20.8  11.7 


 


Random Glucose 92 mg/dl  87 mg/dl 


 


Calcium Level 8.9 mg/dl  8.4 mg/dl 


 


Total Bilirubin 2.7 mg/dl  1.5 mg/dl 


 


Aspartate Amino Transf


(AST/SGOT) 115 U/L 


  120 U/L 


 


 


Alanine Aminotransferase


(ALT/SGPT) 64 U/L 


  114 U/L 


 


 


Alkaline Phosphatase 100 U/L  92 U/L 


 


Total Protein 8.1 gm/dl  6.9 gm/dl 


 


Albumin 4.0 gm/dl  3.4 gm/dl 


 


Globulin 4.1 gm/dl  


 


Albumin/Globulin Ratio 1.0  


 


Lipase 161 U/L  


 


Chemistry Specimen Hemolysis   


 


Prothrombin Time  11.3 SECONDS 


 


Prothromb Time International


Ratio 


  1.1 


 


 


Magnesium Level  1.8 mg/dl 


 


Direct Bilirubin  0.4 mg/dl 











Assessment & Plan


 has a variant of hemoglobin asked that is he has hemoglobin S with 

hereditary persistent fetal hemoglobin.  The usual lifetime story of this sort 

of ovarian is not so marked by repeated vaso-occlusive crises as 

straightforward hemoglobin S since the fetal hemoglobin tends to interfere with 

polymerization of the hemoglobin. However vaso-occlusive crises can occur 

including splenic infarct. His CT scan was reviewed that showed generalized 

splenic infarct. I believe it would be warranted to notify surgery that the 

patient is here not for surgery but just to have somewhat alert should 

something more desperate happen. Otherwise his therapeutic approach would be 

fluid, analgesia, and supportive care including O2. Hemoglobin concentration 

should be done daily. I would hold off on transfusions at this point.

## 2017-01-09 NOTE — MEDICAL STUDENT: [facility]
Med Student Progress Note


Date of Service


Jan 9, 2017.





Subjective


Pt evaluation today including:  conversation w/ patient, physical exam, chart 

review, lab review, review of studies


Pain:  denies pain right now


Voiding:  no voiding problems


no acute events overnight. patient was resting comfortably in bed and answered 

my questions without difficulty. He says that his pain initially was a 6/10 

when he presented to the hospital here in [place], not as severe as his 

presentation in [place] which he rates as a 9/10.


right now he denies any pain and says that it is well controlled on his current 

pain medications.


Only other complaint is of shortness of breath. he believes this to be due to 

pain on deep inspiration and reluctance to take deep breaths because of that.


no fevers noted since his hospitalization in [place].





Review of Systems


Constitutional:  No fever, No sweats


Respiratory:  + shortness of breath (from pain on deep inspiration), No cough


Cardiac:  No chest pain, No orthopnea


Abdomen:  + pain





Objective


Vital Signs











  Date Time  Temp Pulse Resp B/P Pulse Ox O2 Delivery O2 Flow Rate FiO2


 


1/9/17 08:06 36.7 54 17 87/47 96 Room Air  


 


1/9/17 08:00      Room Air  


 


1/9/17 04:33     100 Room Air  


 


1/9/17 02:58 36.6 53 18 117/70 98 Room Air  


 


1/9/17 00:05     100 Room Air  


 


1/8/17 23:15 36.6 56 16 135/82 100 Room Air  


 


1/8/17 22:31  69      


 


1/8/17 21:42 36.9       


 


1/8/17 21:32  74 16 105/69 100   


 


1/8/17 18:36 37.2 100 20 116/63 98 Room Air  











Physical Exam


General Appearance:  no apparent distress, + thin


ENT:  hearing grossly normal


Neck:  supple, no JVD


Respiratory/Chest:  chest non-tender, lungs clear, normal breath sounds


Cardiovascular:  regular rate, rhythm, no edema, no gallop, no JVD, no murmur


Abdomen:  normal bowel sounds, soft, + tenderness (mild LUQ tenderness), + 

splenomegaly (soft spleen tip palpable 2 cm below left costal margin)


Extremities:  non-tender, no pedal edema, no calf tenderness


Neurologic/Psychiatric:  alert, oriented x 3


Skin:  normal color, warm/dry, no rash





Laboratory Results





Last 24 Hours








Test


  1/8/17


19:00 1/9/17


06:55


 


White Blood Count 15.96 K/uL  8.29 K/uL 


 


Red Blood Count 4.30 M/uL  3.98 M/uL 


 


Hemoglobin 10.4 g/dL  9.6 g/dL 


 


Hematocrit 30.5 %  28.0 % 


 


Mean Corpuscular Volume 70.9 fL  70.4 fL 


 


Mean Corpuscular Hemoglobin 24.2 pg  24.1 pg 


 


Mean Corpuscular Hemoglobin


Concent 34.1 g/dl 


  34.3 g/dl 


 


 


Platelet Count 684 K/uL  532 K/uL 


 


Mean Platelet Volume 9.0 fL  8.8 fL 


 


Neutrophils (%) (Auto) 79.9 %  


 


Lymphocytes (%) (Auto) 9.7 %  


 


Monocytes (%) (Auto) 8.9 %  


 


Eosinophils (%) (Auto) 0.8 %  


 


Basophils (%) (Auto) 0.3 %  


 


Neutrophils # (Auto) 12.75 K/uL  


 


Lymphocytes # (Auto) 1.55 K/uL  


 


Monocytes # (Auto) 1.42 K/uL  


 


Eosinophils # (Auto) 0.12 K/uL  


 


Basophils # (Auto) 0.05 K/uL  


 


RDW Standard Deviation 53.2 fL  51.7 fL 


 


RDW Coefficient of Variation 20.7 %  20.4 % 


 


Immature Granulocyte % (Auto) 0.4 %  


 


Immature Granulocyte # (Auto) 0.07 K/uL  


 


Polychromasia 1+  


 


Microcytosis PRESENT  


 


Sickle Cells OCCASIONAL  


 


Target Cells 2+  


 


Schistocytes OCCASIONAL  


 


Absolute Reticulocyte Count 0.17 10^6/uL  


 


Percent Reticulocyte Count 4.2 %  


 


Sodium Level 142 mmol/L  143 mmol/L 


 


Potassium Level 3.8 mmol/L  4.1 mmol/L 


 


Chloride Level 103 mmol/L  108 mmol/L 


 


Carbon Dioxide Level 29 mmol/L  25 mmol/L 


 


Anion Gap 10.0 mmol/L  10.0 mmol/L 


 


Blood Urea Nitrogen 13 mg/dl  7 mg/dl 


 


Creatinine 0.61 mg/dl  0.58 mg/dl 


 


Est Creatinine Clear Calc


Drug Dose 122.9 ml/min 


  140.2 ml/min 


 


 


Estimated GFR (


American) > 150.0 


  > 150.0 


 


 


Estimated GFR (Non-


American 144.8 


  147.8 


 


 


BUN/Creatinine Ratio 20.8  11.7 


 


Random Glucose 92 mg/dl  87 mg/dl 


 


Calcium Level 8.9 mg/dl  8.4 mg/dl 


 


Total Bilirubin 2.7 mg/dl  1.5 mg/dl 


 


Aspartate Amino Transf


(AST/SGOT) 115 U/L 


  120 U/L 


 


 


Alanine Aminotransferase


(ALT/SGPT) 64 U/L 


  114 U/L 


 


 


Alkaline Phosphatase 100 U/L  92 U/L 


 


Total Protein 8.1 gm/dl  6.9 gm/dl 


 


Albumin 4.0 gm/dl  3.4 gm/dl 


 


Globulin 4.1 gm/dl  


 


Albumin/Globulin Ratio 1.0  


 


Lipase 161 U/L  


 


Chemistry Specimen Hemolysis   


 


Prothrombin Time  11.3 SECONDS 


 


Prothromb Time International


Ratio 


  1.1 


 


 


Magnesium Level  1.8 mg/dl 


 


Direct Bilirubin  0.4 mg/dl 











Medications





 Current Inpatient Medications








 Medications


  (Trade)  Dose


 Ordered  Sig/Nicolas


 Route  Start Time


 Stop Time Status Last Admin


Dose Admin


 


 Ioversol


  (Optiray 320)  100 ml  UD  PRN


 IV  1/8/17 20:15


 1/12/17 20:14   


 


 


 Hydromorphone HCl


  (Dilaudid Inj)  1 mg  Q3H  PRN


 IV  1/8/17 22:15


 1/22/17 22:14  1/8/17 23:39


1 MG


 


 Oxycodone/


 Acetaminophen


  (Percocet


 10-325MG Tab)  1 tab  Q4H  PRN


 PO  1/8/17 22:15


 1/22/17 22:14   


 


 


 Heparin Sodium


  (Porcine) 5000


 unit  5,000 unit  Q8


 SQ  1/9/17 06:00


 2/8/17 05:59   


 


 


 Potassium


 Chloride/Dextrose/


 Sod Cl


  (D5nss + 20meq


 KCl)  1,000 ml @ 


 150 mls/hr  Q6H40M


 IV  1/8/17 23:45


 1/9/17 13:04  1/9/17 07:59


150 MLS/HR











Assessment and Plan


Assessment and Plan:


22 yo male with a hx of sickle cell disease presented with left upper quadrant 

pain and suspected sickle cell crisis in context of splenic infarction





1. Sickle cell crisis- Pain is improving with medication and IVF


- Continue IVF while in the hospital


- Monitor CBC for worsening anemia


- Transition to non-opioid pain medications as tolerated


2. Splenic infarction- CT shows evidence of generalized splenic infarcts. 


- surgery not urgent; may be candidate in future for splenectomy


Continued [facility] stay due to:  inadequate oral pain control, multiple IV 

medications needed


Discharge planning:  home

## 2017-01-09 NOTE — DISCHARGE SUMMARY
Discharge Summary


Admission Date:


Jan 8, 2017 at 22:12


Discharge Date:  Jan 9, 2017


Procedures:


CT ABD WITH IV CONTRAST ONLY (CT)





CT DOSE: 174.45 mGy.cm





CLINICAL HISTORY: Left-sided abdominal pain. Sickle cell crisis.    





TECHNIQUE: Axial images of the abdomen were obtained following intravenous


injection of 119 cc Optiray 320 IV.





COMPARISON STUDY:  Left upper quadrant ultrasound January 8, 2017.





FINDINGS: Visualized portions of the lower chest demonstrate groundglass opacity


with the left lower lobe suggestive of atelectasis. There is borderline


cardiomegaly. The liver, adrenal glands, kidneys and pancreas are unremarkable.


There is no biliary or pancreatic ductal dilatation. No hepatic lesions are


identified. There is moderate splenomegaly. The spleen measures 17 cm in


craniocaudal dimension. The appearance of the spleen is markedly abnormal. The


majority of the spleen demonstrates no significant enhancement suggestive of


extensive splenic infarction. There is enhancement of approximately 30-40% of


the spleen. There is no perisplenic fluid. The bladder is only partially imaged


but is distended. Caliber and wall thickness of visualized small and large bowel


are normal. No free air is present within the abdomen. No suspicious skeletal


lesions are identified. There is slight deformity of the endplates, a common


finding in the setting of sickle cell disease. The vertebral bodies are slightly


H shaped.








IMPRESSION:  





1.  Extensive splenic infarction, involving the majority of the spleen. Moderate


splenomegaly. No perisplenic fluid.





2. Distended bladder.





3. Borderline cardiomegaly.











Electronically signed by:  David Riojas M.D.


1/8/2017 9:11 PM


--------------------------------------------------------------------





LEFT UPPER QUADRANT ULTRASOUND





HISTORY:     Sickle cell disease with left upper quadrant pain.





COMPARISON:  None.





FINDINGS: The spleen is moderately enlarged, measuring 17.9 cm in maximal


diameter. The spleen is markedly heterogeneous in appearance. There are multiple


areas of decreased and increased echogenicity about the spleen. There is no


perisplenic fluid. Color flow is identified within the spleen although in areas,


is less than expected.





IMPRESSION: 





Moderately enlarged, markedly heterogeneous spleen with areas of increased and


decreased echogenicity. The findings could reflect infarcts but are not classic


for splenic infarcts. No perisplenic fluid. A contrast-enhanced CT of the


abdomen is recommended for further evaluation. Discussed with Dr. Cummings at time


of dictation.











Electronically signed by:  David Riojas M.D.


1/8/2017 8:19 PM





Dictated Date/Time:  1/8/2017 7:59 PM


---------------------------------------------------------





CHEST ONE VIEW PORTABLE





CLINICAL HISTORY: Sickle cell crisis. Abdominal pain.    





COMPARISON STUDY:  No previous studies for comparison.





FINDINGS: Lung volumes are mildly diminished. Linear left basilar opacity is


suggestive of atelectasis. Right lung is clear. There is suspected mild


enlargement of the cardiac silhouette. Pulmonary vascularity is normal. 





IMPRESSION:  





1. Linear left basilar opacity which favors atelectasis.





2. Mild enlargement of the cardiac silhouette. 














Electronically signed by:  David Riojas M.D.


1/8/2017 7:10 PM





Dictated Date/Time:  1/8/2017 7:09 PM


-------------------------------------------------------------------





Last Resulted CBC


1/9/17 06:55








Last Resulted BMP


1/9/17 06:55








Consultations:


heme/onc


Medication Reconciliation


New Medications:  


Oxycodone/Acetaminophen 10MG/325MG (Oxycodone/Acetaminophen 10MG/325MG) 1 Tab 

Tab


1 TAB PO Q4H PRN for Pain, #30 TAB





 


Discontinued Medications:  


Acetaminophen/Codeine (Tylenol W/Codeine #3) 300 Mg/30 Mg Tab


1 TAB PO UD PRN for Pain, TAB





Hydrocodone/Acetaminophen 10MG/325MG (Norco 10MG/325MG)  Tab


1 TAB PO TID PRN for Pain for 30 Days, #90 TAB


PRN PAIN








Discharge Exam


Physical Exam:  


   General Appearance:  no apparent distress


   Eyes:  EOMI


   ENT:  hearing grossly normal


   Neck:  trachea midline


   Respiratory/Chest:  no respiratory distress, no accessory muscle use


   Abdomen / GI:  soft


   Extremities:  normal inspection


   Neurologic/Psychiatric:  CNs II-XII nml as tested, alert, normal mood/affect


   Skin:  normal color, warm/dry





Hospital Course


sickle cell crisis


-seems to have been provoked by dehydration (while travelling) and ?effects of 

low O2 tension while in flight


-improved w hydration/ pain control


-really wants discharge, and does appear stable for discharge


-arranged close and ongoing outpt f/u w local PCP and hematology


-percocet prn pain


-anticipate initiation of hydroxyurea in near future





splenic infarct


-from sickle cell


-per pt hx, appears this has been a slowly worsening process over years - 

current pain likely a small new infarct.  no hemodynamic instability, pain 

totally resolved.  feeling better and wants to go home


-outlined "red flag" signs and symptoms


-got pneumovax today, would also give consideration to meningococcal and 

hemophilus vaccines and low threshold to eval for fevers - not clear if he's 

functionally asplenic but have to consider as possibility





anemia


-from sickle cell crisis


-no indication for transfusion


-CBC 3 days, ongoing outpt f/u





elevated AST, ALT


-repeat CMP 1wk





thrombocytosis 


-f/u CBC as above





leukocytosis


-resolved


Total Time Spent:  Greater than 30 minutes


This includes examination of the patient, discharge planning, medication 

reconciliation, and communication with other providers.





Discharge Instructions


Please refer to the electronic Patient Visit Report (Discharge Instructions) 

for additional information.





Follow-Up


CBC on/around 1/12


PCP appt scheduled


outpt hematology/oncology to be arranged





Additional Copies To


Wale Diaz D.O.; Jass Mcnamara MD

## 2017-01-09 NOTE — DISCHARGE INSTRUCTIONS
Discharge Instructions


Admission


Reason for Admission:  Sickle Cell Crisis, Splenic Infarct





Discharge


Discharge Diagnosis / Problem:  sickle cell crisis and splenic infarct





Discharge Goals


Goal(s):  Decrease discomfort, Diagnostic testing





Activity Recommendations


Activity Limitations:  resume your previous activity





.





Instructions / Follow-Up


Instructions / Follow-Up


sickle cell crisis


-this appears to have come about from being dehydrated, and maybe effects of 

low oxygen and altitude during the flight


-in the future, definitely make sure you stay well hydrated all the time - 

especially when you travel; we'll also want you talking more with Dr Diaz 

and Dr Mcnamara about the benefit of oxygen during flights (and helping you 

through the craziness of arranging it)


-fortunately things are appearing more stable now; we'll want repeat bloodwork (

CBC) done Thursday or Friday, to follow up on your counts (you can have this 

done at [facility] or at Dr Mcnamara's office)


-Dr Diaz and Dr Mcnamara will also talk to you more about starting hydroxyurea

, which can help reduce how much you run into sickle cell crises


-stay warm!  while not that common, people have been known to have a crisis 

after exposure to severe cold!





splenic infarct


-this came about because of the sickle cell crisis - when you sickle, the cells 

will clog up small capillaries (tiny blood vessels) and then downstream tissue 

dies off.  your spleen is basically made up of a huge web of small blood 

vessels - so the crises have led to the spleen slowly getting "knocked off" 


-while things appear stable at this time, obviously any new/worsening pain, or 

any lightheaded/weakness could signal a new problem or even a bleeding spleen (

highly unlikely) - these would constitute an emergency and we'd want you seen 

right away


-because it's very difficult to tell how much your spleen is still working, we'

ll want them to look at you for the benefits of treating like your spleen isn't 

working at all (vaccines for what are called "encapsulated organisms" (like 

pneumococcus, meningococcus, hemophilus, etc)


-also because it's hard to tell how much your spleen is/isn't working, we'll 

want you to take a fever seriously - and get looked at right away any time you 

have a temp above 101F (38C)





pain control


-because splenic infarcts can hurt, we've increased the strength of your pain 

medication to oxycodone.  put the codeine and hydrocodone on the shelf, since 

taking them together with the oxycodone certainly can risk bad problems


-that said, you're very unlikely to develop physical dependancy using a pain 

medication for a short term - typically it takes at least a month of routine 

use for someone to develop tolerance, so it's not very likely to happen over 

the short term


-we hesitate to have you take any anti-inflammatories instead of narcotics, 

because anti-inflammatories also can reduce how much your blood vessels are 

able to dilate, making it more potentially problematic for you to have 

downstream effects of lack of blood flow











we have you set up to follow up with Dr Mcnamara, one of our family medicine 

residents.  he'll take over as your primary care doc while you're here in town.

  we'll also be working on getting you in with Dr Diaz specifically for 

sickle cell expertise.





Current Hospital Diet


Patient's current hospital diet: Regular Diet





Discharge Diet


Recommended Diet:  Regular Diet





Pending Studies


Studies pending at discharge:  no





Medical Emergencies








.


Who to Call and When:





Medical Emergencies:  If at any time you feel your situation is an emergency, 

please call 911 immediately.





.





Non-Emergent Contact


Non-Emergency issues call your:  Primary Care Provider





.


.





"Provider Documentation" section prepared by Efren Hendricks.





VTE Core Measure


Inpt VTE Proph given/why not?:  Unfractionated heparin SQ

## 2017-02-11 ENCOUNTER — HOSPITAL ENCOUNTER (EMERGENCY)
Dept: HOSPITAL 45 - C.EDB | Age: 22
Discharge: LEFT BEFORE DISCHARGE | End: 2017-02-11
Payer: COMMERCIAL

## 2017-02-11 VITALS
HEIGHT: 62.99 IN | WEIGHT: 103.4 LBS | HEIGHT: 62.99 IN | WEIGHT: 103.4 LBS | BODY MASS INDEX: 18.32 KG/M2 | BODY MASS INDEX: 18.32 KG/M2

## 2017-02-11 VITALS
SYSTOLIC BLOOD PRESSURE: 109 MMHG | DIASTOLIC BLOOD PRESSURE: 65 MMHG | TEMPERATURE: 98.6 F | HEART RATE: 80 BPM | OXYGEN SATURATION: 100 %

## 2017-02-11 DIAGNOSIS — R06.02: Primary | ICD-10-CM

## 2017-03-22 ENCOUNTER — HOSPITAL ENCOUNTER (INPATIENT)
Dept: HOSPITAL 45 - C.EDB | Age: 22
LOS: 1 days | Discharge: HOME | DRG: 812 | End: 2017-03-23
Attending: FAMILY MEDICINE | Admitting: HOSPITALIST
Payer: COMMERCIAL

## 2017-03-22 VITALS
HEIGHT: 64 IN | WEIGHT: 107.81 LBS | WEIGHT: 107.81 LBS | HEIGHT: 64 IN | BODY MASS INDEX: 18.4 KG/M2 | BODY MASS INDEX: 18.4 KG/M2

## 2017-03-22 DIAGNOSIS — D57.00: Primary | ICD-10-CM

## 2017-03-22 DIAGNOSIS — K80.20: ICD-10-CM

## 2017-03-22 DIAGNOSIS — I25.2: ICD-10-CM

## 2017-03-22 LAB
ALP SERPL-CCNC: 51 U/L (ref 45–117)
ALT SERPL-CCNC: 44 U/L (ref 12–78)
ANION GAP SERPL CALC-SCNC: 9 MMOL/L (ref 3–11)
AST SERPL-CCNC: 48 U/L (ref 15–37)
BASOPHILS # BLD: 0.05 K/UL (ref 0–0.2)
BASOPHILS NFR BLD: 0.5 %
BUN SERPL-MCNC: 9 MG/DL (ref 7–18)
BUN/CREAT SERPL: 12.5 (ref 10–20)
CALCIUM SERPL-MCNC: 8.8 MG/DL (ref 8.5–10.1)
CHLORIDE SERPL-SCNC: 105 MMOL/L (ref 98–107)
CO2 SERPL-SCNC: 30 MMOL/L (ref 21–32)
COMPLETE: YES
CREAT CL PREDICTED SERPL C-G-VRATE: 120.9 ML/MIN
CREAT SERPL-MCNC: 0.68 MG/DL (ref 0.6–1.4)
EOSINOPHIL NFR BLD AUTO: 271 K/UL (ref 130–400)
GLUCOSE SERPL-MCNC: 114 MG/DL (ref 70–99)
HCT VFR BLD CALC: 33.4 % (ref 42–52)
IG%: 0.2 %
IMM GRANULOCYTES NFR BLD AUTO: 46 %
LYMPHOCYTES # BLD: 4.19 K/UL (ref 1.2–3.4)
MCH RBC QN AUTO: 25.3 PG (ref 25–34)
MCHC RBC AUTO-ENTMCNC: 35.6 G/DL (ref 32–36)
MCV RBC AUTO: 70.9 FL (ref 80–100)
MONOCYTES NFR BLD: 13.2 %
NEUTROPHILS # BLD AUTO: 5.5 %
NEUTROPHILS NFR BLD AUTO: 34.6 %
POTASSIUM SERPL-SCNC: 3.5 MMOL/L (ref 3.5–5.1)
RBC # BLD AUTO: 4.71 M/UL (ref 4.7–6.1)
SODIUM SERPL-SCNC: 144 MMOL/L (ref 136–145)
WBC # BLD AUTO: 9.11 K/UL (ref 4.8–10.8)

## 2017-03-22 NOTE — EMERGENCY ROOM VISIT NOTE
History


Report prepared by Alisson:  Lorena Serra


Under the Supervision of:  Dr. Daniel Baxter M.D.


First contact with patient:  23:16


Chief Complaint:  ABDOMINAL PAIN


Stated Complaint:  PAIN


Nursing Triage Summary:  


pt reports hx of sickle cell and a bad spleen.  had sudden onset RUQ abdominal 


pain tonight. pt denies N/V/D.  rates pain 10/10.





History of Present Illness


The patient is a 21 year old male who presents to the Emergency Room with 

complaints of persistent left upper quadrant abdominal pain that began today.  

He currently rates his discomfort as a 10/10 in severity.  The patient states 

that he has a history of sickle cell anemia and states that he has a bad 

spleen.  He states that his pain began suddenly today.  The patient states that 

he is feeling short of breath today.  He denies any trauma or injury to the 

area.  The patient denies any fever chills, nausea, vomiting, or urinary 

symptoms.  He states that he has had this happen in the past, noting that he 

previously used oxycodone for his discomfort.  The patient denies any joint 

aches.  He denies taking anything for his discomfort today.





   Source of History:  patient


   Onset:  today


   Position:  abdomen (LUQ)


   Symptom Intensity:  10/10


   Timing:  other


   Associated Symptoms:  + SOB, No chills, No fevers, No nausea, No vomiting





Review of Systems


See HPI for pertinent positives & negatives. A total of 10 systems reviewed and 

were otherwise negative.





Past Medical & Surgical


Medical Problems:


(1) Elevated bilirubin


(2) Intractable abdominal pain


(3) Sickle cell anemia


(4) Sickle cell anemia





Old medical records were reviewed. Nurse's notes were reviewed and I agree with.





Family History





Diabetes mellitus


FH: heart disease


Hypertension


Kidney disease





Social History


Smoking Status:  Current Every Day Smoker


Smokeless Tobacco Use:  No


Alcohol Use:  none


Occupation Status:  [employer] student





Current/Historical Medications


No Active Prescriptions or Reported Meds





Allergies


Coded Allergies:  


     No Known Allergies (Unverified , 1/8/17)





Physical Exam


Vital Signs











  Date Time  Temp Pulse Resp B/P Pulse Ox O2 Delivery O2 Flow Rate FiO2


 


3/23/17 00:35  62 16 112/81 100 Nasal Cannula 2.0 


 


3/22/17 22:44 36.4 64 22 111/74 97 Room Air  











Physical Exam


General: Well developed well nourished, uncomfortable appearing young male, 

complaining of pain, no respiratory distress, breathing comfortably on room 

air. Normal speech


HEENT: Normal cephalic atraumatic.  Pupils are equal round and reactive to 

light.  Sclera anicteric.  Extraocular movements are intact.  Oropharynx is 

pink with moist mucous membranes.  No swelling of the mouth lips or tongue.


Neck: Supple with a midline trachea.  No meningeal signs or stiffness, no JVD 

or bruits. No Stridor.


Chest: Clear to auscultation bilaterally.  No wheezes or rhonchi.  No increased 

work of breathing.


Heart: regular rate and rhythm. 


Abdomen: Minimally diffusely tender.  Soft, nondistended without rebound 

guarding or rigidity.  


Extremities: No cyanosis clubbing or edema. No calf tenderness or assymetry


Spine/Back. Non tender to palpation. No CVA tenderness


Skin: Good turgor without rashes.


Neurologic exam: Cranial nerves two through 12 are intact.  Motor and sensation 

are intact and symmetrical throughout.





Medical Decision & Procedures


ER Provider


Diagnostic Interpretation:


Chest x-ray per my interpretation reveals no pneumothorax, failure, infiltrate, 

or free air.





CT results as stated below per my review and radiologist interpretation: 





CT Abdomen and Pelvis: 





Comparison is made to CT dated 1/8/17.





There is minimal patchy groundglass opacity in the dependent right lower lobe.





There is periportal edema, increased from prior exam.  This can be seen in the 

setting of aggressive IV hydration in addition to pathological causes.  The 

gallbladder, pancreas, adrenal glands, and kidneys are within normal limits.





There is splenomegaly measuring 13.4 cm, decreased from 17 cm previously.  

Again noted are regions of infarction involving the inferior and medial spleen.

  The proportion of splenic tissue which enhances normally has increased from 

prior exam, no exceeding 50%.  There is no perisplenic fluid.





There is no evidence of small or large bowel obstruction.  Multiple fluid-

filled loops of small bowel are noted and may represent a nonspecific 

enteritis.  The appendix is not clearly visualized, but there are no secondary 

signs of acute appendicitis.





Urinary bladder and prostate are unremarkable.





There are no acute osseous findings.  There is stable mild H-shaped 

configuration of vertebral bodies, compatible with sickle cell disease.





Radiologist: Shavonne Sahni MD            Phone: [phone]





Study ready at 0041 and initial results transmitted at 0050





Laboratory Results


3/22/17 22:55








Red Blood Count 4.71, Mean Corpuscular Volume 70.9, Mean Corpuscular Hemoglobin 

25.3, Mean Corpuscular Hemoglobin Concent 35.6, Neutrophils (%) (Auto) 34.6, 

Lymphocytes (%) (Auto) 46.0, Monocytes (%) (Auto) 13.2, Eosinophils (%) (Auto) 

5.5, Basophils (%) (Auto) 0.5, Neutrophils # (Auto) 3.15, Lymphocytes # (Auto) 

4.19, Monocytes # (Auto) 1.20, Eosinophils # (Auto) 0.50, Basophils # (Auto) 

0.05





3/22/17 22:55

















Test


  3/22/17


22:55


 


White Blood Count


  9.11 K/uL


(4.8-10.8)


 


Red Blood Count


  4.71 M/uL


(4.7-6.1)


 


Hemoglobin


  11.9 g/dL


(14.0-18.0)


 


Hematocrit 33.4 % (42-52) 


 


Mean Corpuscular Volume


  70.9 fL


()


 


Mean Corpuscular Hemoglobin


  25.3 pg


(25-34)


 


Mean Corpuscular Hemoglobin


Concent 35.6 g/dl


(32-36)


 


Platelet Count


  271 K/uL


(130-400)


 


Neutrophils (%) (Auto) 34.6 % 


 


Lymphocytes (%) (Auto) 46.0 % 


 


Monocytes (%) (Auto) 13.2 % 


 


Eosinophils (%) (Auto) 5.5 % 


 


Basophils (%) (Auto) 0.5 % 


 


Neutrophils # (Auto)


  3.15 K/uL


(1.4-6.5)


 


Lymphocytes # (Auto)


  4.19 K/uL


(1.2-3.4)


 


Monocytes # (Auto)


  1.20 K/uL


(0.11-0.59)


 


Eosinophils # (Auto)


  0.50 K/uL


(0-0.5)


 


Basophils # (Auto)


  0.05 K/uL


(0-0.2)


 


RDW Standard Deviation


  41.5 fL


(36.4-46.3)


 


RDW Coefficient of Variation


  16.0 %


(11.5-14.5)


 


Immature Granulocyte % (Auto) 0.2 % 


 


Immature Granulocyte # (Auto)


  0.02 K/uL


(0.00-0.02)


 


Absolute Reticulocyte Count


  0.11 10^6/uL


(0.02-0.10)


 


Percent Reticulocyte Count


  2.4 %


(0.5-2.0)


 


Anion Gap


  9.0 mmol/L


(3-11)


 


Est Creatinine Clear Calc


Drug Dose 120.9 ml/min 


 


 


Estimated GFR (


American) > 150.0 


 


 


Estimated GFR (Non-


American 138.5 


 


 


BUN/Creatinine Ratio 12.5 (10-20) 


 


Calcium Level


  8.8 mg/dl


(8.5-10.1)


 


Total Bilirubin


  3.3 mg/dl


(0.2-1)


 


Direct Bilirubin


  0.5 mg/dl


(0-0.2)


 


Aspartate Amino Transf


(AST/SGOT) 48 U/L (15-37) 


 


 


Alanine Aminotransferase


(ALT/SGPT) 44 U/L (12-78) 


 


 


Alkaline Phosphatase


  51 U/L


()


 


Total Protein


  8.3 gm/dl


(6.4-8.2)


 


Albumin


  4.5 gm/dl


(3.4-5.0)


 


Lipase


  92 U/L


()








Laboratory studies as stated above per my review.





Medications Administered











 Medications


  (Trade)  Dose


 Ordered  Sig/Nicolas


 Route  Start Time


 Stop Time Status Last Admin


Dose Admin


 


 Sodium Chloride  1,000 ml @ 


 999 mls/hr  Q1H1M STAT


 IV  3/22/17 23:22


 3/23/17 00:22 DC 3/22/17 23:31


999 MLS/HR


 


 Sodium Chloride


  (Nss 1000ml)  1,000 ml @ 


 200 mls/hr  Q5H ONCE


 IV  3/22/17 23:22


 3/23/17 02:54 DC 3/22/17 23:32


200 MLS/HR


 


 Ondansetron HCl


  (Zofran Inj)  4 mg  NOW  STAT


 IV  3/22/17 23:22


 3/22/17 23:25 DC 3/22/17 23:31


4 MG


 


 Morphine Sulfate


  (MoRPHine


 SULFATE INJ)  6 mg  NOW  STAT


 IV  3/22/17 23:22


 3/22/17 23:25 DC 3/22/17 23:32


6 MG


 


 Naloxone HCl


  (Narcan Inj)  0.4 mg  STK-MED ONCE


 .ROUTE  3/22/17 23:33


 3/22/17 23:36 DC 3/22/17 23:38


0.2 MG











ED Course


2317: Past medical records reviewed. The patient was evaluated in room A12B, 

and a complete history and physical examination were performed.





2322: Ordered Morphine Sulfate 6 mg IV, Zofran Inj 4 mg IV, Sodium Chloride 

1000 ml @ 200 mls/hr IV, Sodium Chloride 1000 ml @ 999 mls/hr IV.





2333: Ordered Narcan Inj 0.4 mg .route.





2337: After the patient received his narcotics, he became unresponsive and his 

saturation dropped.  The patient was given .2 of Narcan and placed on oxygen.  

He immediately woke up.





2348: I reevaluated the patient and he is resting comfortably.  He is mildly 

sleepy but arousable.  He answers questions appropriately.  The patient has an 

O2 saturation of 100%.





2358: I reevaluated the patient and he is awake, but still has mild pain.  His 

vitals are stable.  The patient is going to have a CT scan.





0018: I went to reevaluate the patient and he is at CT scan.





0054: I reevaluated the patient and he is pain free and resting comfortably.





0110: I discussed the patients case with René Diaz. He recommends that 

the patient is evaluated for further treatment.





0116: I reevaluated the patient and he is resting.  I discussed the exam 

findings with him and I discussed the treatment plan.  He verbalized complete 

understanding and agreement.  He will be evaluated for further treatment.





0129: I discussed the patients case with Dr. Shah, [facility].  She 

will evaluate the patient for further treatment.





Medical Decision


Differentials include, but are not limited to; sickle cell crisis, gallbladder 

disease, liver disease, splenic process, electrolyte or metabolic abnormality, 

dehydration, anemia.





This patient comes in as described above.  He apparently has sickle cell 

disease and has abdominal pain and he's had this before.  He has infarcted 

spleen in the past.  He appears uncomfortable but his abdomen is nontender or 

distended.  IV access established and blood work was obtained. I reviewed his 

old records and the last time and she was given morphine 6 mg IV and I gave him 

the same dose and it was given slowly however this made the patient very 

somnolent and he got briefly hypoxemic and unresponsive.  He was given 0.2 mg 

of Narcan IV and woke up immediately.  He remained stable after that and woke 

up and started complaining that he wanted more pain medication.  He is not 

significantly anemic compared to baseline.  He has no white count or fever to 

suggest infection.  He has no significant electrolyte or metabolic 

abnormalities is told the bilirubin is mildly elevated as intensity of baseline 

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  He continue to be 

on a monitor while he was here I did a CT of his abdomen and pelvis.  CAT scan 

shows no definite findings acutely.  He does have splenic infarct which looks 

less than in January.  I did discuss case Dr. Diaz ,who recommended we 

continue IV hydration and oxygen and he will see him in the morning and will 

likely be a little be discharged after some observation.  The patient seems a 

feeling much better at present without pain.  I did relay to the admitting team 

as well his sensitivity to pain medication.  He will be observed





Consults


Time Called:  0108


Consulting Physician:  Dr. Diaz, Oncology


Returned Call:  0110


I discussed the patients case with Joe, Oncology. He recommends that the 

patient is evaluated for further treatment.


Additional Consults:  


   Time Called:  0128


   Consulted Physician:  [facility] Resident


   Returned Call:  0129


Additional Comments:


I discussed the patients case with Dr. Shah, [facility].  She will 

evaluate the patient for further treatment.





Impression





 Primary Impression:  


 Central abdominal pain


 Additional Impressions:  


 Sickle cell disease


 Splenic infarct





Scribe Attestation


The scribe's documentation has been prepared under my direction and personally 

reviewed by me in its entirety. I confirm that the note above accurately 

reflects all work, treatment, procedures, and medical decision making performed 

by me.





Departure Information


Dispostion


Being Evaluated By Hospitalist





Prescriptions





No Active Prescriptions or Reported Meds





Referrals


No Doctor, Assigned (PCP)





Problem Qualifiers

## 2017-03-23 VITALS
TEMPERATURE: 100.22 F | HEART RATE: 51 BPM | OXYGEN SATURATION: 100 % | SYSTOLIC BLOOD PRESSURE: 111 MMHG | DIASTOLIC BLOOD PRESSURE: 74 MMHG

## 2017-03-23 VITALS
OXYGEN SATURATION: 100 % | HEART RATE: 51 BPM | SYSTOLIC BLOOD PRESSURE: 111 MMHG | TEMPERATURE: 100.22 F | DIASTOLIC BLOOD PRESSURE: 74 MMHG

## 2017-03-23 LAB
APPEARANCE UR: (no result)
BILIRUB UR-MCNC: (no result) MG/DL
COLOR UR: YELLOW
MANUAL MICROSCOPIC REQUIRED?: NO
NITRITE UR QL STRIP: (no result)
PH UR STRIP: 7.5 [PH] (ref 4.5–7.5)
REVIEW REQ?: NO
SP GR UR STRIP: 1.03 (ref 1–1.03)
URINE BILL WITH OR WITHOUT MIC: (no result)
UROBILINOGEN UR-MCNC: (no result) MG/DL

## 2017-03-23 NOTE — PROGRESS NOTE
Progress Note


Date of Service


Mar 23, 2017.





Progress Note


Patient admitted in the early am for Sickle Cell crisis.  Pain is controlled 

now. awaiting consult from heme/onc.

## 2017-03-23 NOTE — DIAGNOSTIC IMAGING REPORT
CHEST ONE VIEW PORTABLE



HISTORY: Atypical  CHEST PAIN



COMPARISON: Chest 1/8/2017.



FINDINGS: The lungs are clear. Cardiac silhouette is top normal in size. No

pleural effusions. No pneumothorax.



IMPRESSION:

No acute process.







Electronically signed by:  Loc Sutton M.D.

3/23/2017 7:28 AM



Dictated Date/Time:  3/23/2017 7:26 AM

## 2017-03-23 NOTE — ONCOLOGY CONSULTATION
Oncology/Heme Consultation


Date of Consultation:


Mar 23, 2017.


Attending Physician:


Enoch Velarde M.D.


Reason for Consultation:


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 

fetal hemoglobin





Abdominal pain rule out crisis


History of Present Illness


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 

sickle cell anemia that is hemoglobin S with hereditary persistent fetal 

hemoglobin. He is really never had an issue with crisis except when he was much 

younger. However recently he was hospitalized with splenic infarction. He was 

actually scheduled to follow-up in our clinic today. 





Last evening he was admitted with sharp midepigastric abdominal pain that 

seemed to radiate into the left upper quadrant.





He became concerned and presented to the emergency room. Blood work was 

acceptable. Interestingly the CT scan which was reviewed shows a fair amount of 

splenic tissue that has radiologically recovered since his splenic infarction.  

Otherwise the really wasn't much change to his CT scan. This morning he feels 

generally well. He states his pain is better.





Past Medical/Surgical History


Medical Problems:


(1) Central abdominal pain


Status: Acute  





(2) LUQ abdominal pain


Status: Acute  





(3) Sickle cell crisis


Status: Acute  





(4) Sickle cell disease


Status: Acute  





(5) Splenic infarct


Status: Acute  





(6) Splenic infarct


Status: Acute  








Family History





Diabetes mellitus


FH: heart disease


Hypertension


Kidney disease





Social History


Smoking Status:  Current Some Day Smoker


Smokeless Tobacco Use:  No


Alcohol Use:  none


Occupation Status:  [employer] student





Allergies


Coded Allergies:  


     No Known Allergies (Unverified , 1/8/17)





Home Medications


No Active Prescriptions or Reported Meds





Current Inpatient Medications





 Current Inpatient Medications








 Medications


  (Trade)  Dose


 Ordered  Sig/Nicolas


 Route  Start Time


 Stop Time Status Last Admin


Dose Admin


 


 Ioversol


  (Optiray 320)  100 ml  UD  PRN


 IV  3/23/17 00:00


 3/27/17 00:00   


 


 


 Al Hydrox/Mg


 Hydrox/Simethicone


  (Maalox Max Susp)  15 ml  Q4H  PRN


 PO  3/23/17 02:00


 4/22/17 01:59   


 


 


 Magnesium


 Hydroxide


  (Milk Of


 Magnesia Susp)  30 ml  Q6H  PRN


 PO  3/23/17 02:00


 4/22/17 01:59   


 


 


 Polyethylene


  (Miralax Powder


 Packet)  17 gm  DAILY  PRN


 PO  3/23/17 02:00


 4/22/17 01:59   


 


 


 Zolpidem Tartrate


  (Ambien Tab)  5 mg  HSZ  PRN


 PO  3/23/17 02:00


 4/22/17 01:59   


 


 


 Ondansetron HCl


  (Zofran Inj)  4 mg  Q6H  PRN


 IV  3/23/17 02:00


 4/22/17 01:59   


 


 


 Morphine Sulfate


  (MoRPHine


 SULFATE INJ)   @   Q4H  PRN


 IV  3/23/17 02:15


 4/6/17 02:14   


 


 


 Ketorolac


 Tromethamine 15 mg  15 mg  Q6H  PRN


 IV  3/23/17 02:15


 3/28/17 02:14  3/23/17 05:21


15 MG


 


 Potassium


 Chloride/Sodium


 Chloride  1,000 ml @ 


 100 mls/hr  Q10H


 IV  3/23/17 03:00


 3/23/17 22:59  3/23/17 02:59


100 MLS/HR


 


 Pantoprazole


 Sodium/Syringe


  (Protonix Inj/


 Syringe)  10 ml @ 5


 mls/min  DAILY@11


 IV  3/23/17 11:00


 4/22/17 10:59   


 











Review of Systems


Constitutional:  Negative for weight loss, night sweats, or fever


Eyes:  Negative for event change of vision


ENT:  Negative for epistaxis, nasal discharge, sore throat, or deafness


Cardiovascular:  Negative for chest pain, palpitations, dizziness, diaphoresis


Respiratory:  Negative for new shortness of breath,hemoptysis, or purulent cough


Gastrointestinal:  Negative for diarrhea, hematemesis, melena, nausea, vomiting

, or dyspepsia


Integumentary (skin):  Negative for rash or jaundice discoloration


Genitourinary:  Negative for urinary frequency, hematuria, or dysuria


Neurological:  Negative for weakness, seizure activity, headache, or dizziness


Lymphatic/Hematologic:  Negative for petechiae, bleeding or new adenopathy


Musculoskeletal:  Negative for new joint or back pain


Allergic/Immunologic:  Negative for unusual rash or pruritis.





Physical Exam











  Date Time  Temp Pulse Resp B/P Pulse Ox O2 Delivery O2 Flow Rate FiO2


 


3/23/17 02:40      Nasal Cannula 2.0 


 


3/23/17 02:40 37.9 51 18 111/74 100 Nasal Cannula 2.0 


 


3/23/17 02:21  58 18 97/69 100   


 


3/23/17 02:20  58 18 97/69 100 Nasal Cannula 2.0 


 


3/23/17 00:35  62 16 112/81 100 Nasal Cannula 2.0 


 


3/22/17 22:44 36.4 64 22 111/74 97 Room Air  








Constitutional: vitals are stable.


Eyes: Eyes are BRENT EOMI without conjuctival erythema or icterus.


ENT: External examination was negative for masses.


Neck: Negative for masses or palpable thyromegaly


Respiratory:  Lung sounds were generally clear bilaterally


Cardiovascular: Heart was RRR without significant murmur, gallops aoe rubs


Gastrointestinal: No palpable hepatic or splenomegaly. The abdomen was soft 

with normal bowel sounds.


Lymphatic system:  there was no palpable peripheral lymphadenopathy


Musculoskeletal System:  The musculoskeletal system seemed concordant with age.


Skin: The skin was negative for jaundice.


Neurologic exam: The exam was negative for any focal findings.  Deep tendon 

reflexes were equal and symmetrical.


Psychiatric exam: Was essentially negative with normal mood and effect.


Extremities: Negative for edema or erythema





Laboratory Results





Last 24 Hours








Test


  3/22/17


22:55 3/23/17


00:00 3/23/17


06:35


 


White Blood Count 9.11 K/uL   


 


Red Blood Count 4.71 M/uL   


 


Hemoglobin 11.9 g/dL   


 


Hematocrit 33.4 %   


 


Mean Corpuscular Volume 70.9 fL   


 


Mean Corpuscular Hemoglobin 25.3 pg   


 


Mean Corpuscular Hemoglobin


Concent 35.6 g/dl 


  


  


 


 


Platelet Count 271 K/uL   


 


Neutrophils (%) (Auto) 34.6 %   


 


Lymphocytes (%) (Auto) 46.0 %   


 


Monocytes (%) (Auto) 13.2 %   


 


Eosinophils (%) (Auto) 5.5 %   


 


Basophils (%) (Auto) 0.5 %   


 


Neutrophils # (Auto) 3.15 K/uL   


 


Lymphocytes # (Auto) 4.19 K/uL   


 


Monocytes # (Auto) 1.20 K/uL   


 


Eosinophils # (Auto) 0.50 K/uL   


 


Basophils # (Auto) 0.05 K/uL   


 


RDW Standard Deviation 41.5 fL   


 


RDW Coefficient of Variation 16.0 %   


 


Immature Granulocyte % (Auto) 0.2 %   


 


Immature Granulocyte # (Auto) 0.02 K/uL   


 


Absolute Reticulocyte Count 0.11 10^6/uL   


 


Percent Reticulocyte Count 2.4 %   


 


Sodium Level 144 mmol/L   


 


Potassium Level 3.5 mmol/L   


 


Chloride Level 105 mmol/L   


 


Carbon Dioxide Level 30 mmol/L   


 


Anion Gap 9.0 mmol/L   


 


Blood Urea Nitrogen 9 mg/dl   


 


Creatinine 0.68 mg/dl   


 


Est Creatinine Clear Calc


Drug Dose 120.9 ml/min 


  


  


 


 


Estimated GFR (


American) > 150.0 


  


  


 


 


Estimated GFR (Non-


American 138.5 


  


  


 


 


BUN/Creatinine Ratio 12.5   


 


Random Glucose 114 mg/dl   


 


Calcium Level 8.8 mg/dl   


 


Total Bilirubin 3.3 mg/dl   


 


Direct Bilirubin 0.5 mg/dl   


 


Aspartate Amino Transf


(AST/SGOT) 48 U/L 


  


  


 


 


Alanine Aminotransferase


(ALT/SGPT) 44 U/L 


  


  


 


 


Alkaline Phosphatase 51 U/L   


 


Total Protein 8.3 gm/dl   


 


Albumin 4.5 gm/dl   


 


Lipase 92 U/L   


 


Urine Color  YELLOW  


 


Urine Appearance  CLOUDY  


 


Urine pH  7.5  


 


Urine Specific Gravity  1.034  


 


Urine Protein  NEG  


 


Urine Glucose (UA)  NEG  


 


Urine Ketones  NEG  


 


Urine Occult Blood  NEG  


 


Urine Nitrite  NEG  


 


Urine Bilirubin  NEG  


 


Urine Urobilinogen  NEG  


 


Urine Leukocyte Esterase  NEG  


 


Urine WBC (Auto)  1-5 /hpf  


 


Urine RBC (Auto)  0-4 /hpf  


 


Urine Hyaline Casts (Auto)  1-5 /lpf  


 


Urine Epithelial Cells (Auto)  10-20 /lpf  


 


Urine Bacteria (Auto)  NEG  


 


Lactate Dehydrogenase   258 U/L 











Assessment & Plan


Variant hemoglobin S that is sickle cell hemoglobin S with hereditary 

persistent fetal hemoglobin.  LDH is 258. I doubt that the abdominal pain was a 

result of crisis however this will have to remain unclear. If an amylase and 

lipase marked on done and we should do those tests. However this morning he 

looks and quite stable. His abdomen is soft. If in fact he is able to eat then 

I suspect he can be discharged.





He was actually scheduled to be seen in our clinic today. One of the topics 

that was to be discussed was beginning hydroxyurea. I started my discussion by 

trying to delay beginning Hydrea until there was clear stability in regards to 

his abdomen. However he was aware of this drug and wanted to begin. He 

understands that it is a form of chemotherapy. We will begin had a small dose 

of the prescription of 500 mg capsules were given to him to begin as an 

outpatient. The major potential side effects of mild nausea and risk of 

cytopenias with risk of fever and infection were all reviewed with him. He will 

have a follow-up in our clinic in about 2 weeks. He appears clinically quite 

stable.





I did point out to him that using Hydrea in this circumstance is debatable. The 

role and mechanism of using Hydrea in straightforward sickle cell is to 

ameliorate the disease by increasing fetal hemoglobin. His hemoglobinopathy is 

such that his fetal hemoglobin is already quite elevated. However there is a 

secondary component and potential mechanism for Hydrea in that it does affect 

the deep of red cells and the red cells and there flow (rheology).  He 

understands this but one a to begin the medicine.





Thank you for helping in his care.

## 2017-03-23 NOTE — DISCHARGE INSTRUCTIONS
Discharge Instructions


Date of Service


Mar 23, 2017.





Admission


Reason for Admission:  Abdominal pain, Sickle Cell disease





Discharge


Discharge Diagnosis / Problem:  Abdominal pain/Sickle Cell disease/

cholelithiasis





Discharge Goals


Goal(s):  Decrease discomfort, Increase independence





Activity Recommendations


Activity Limitations:  resume your previous activity





.





Instructions / Follow-Up


Instructions / Follow-Up


PCP - 5-7 days





Hematology, Dr. Diaz - call office to schedule an appointment for 2 weeks 

from now.





Current Hospital Diet


Patient's current hospital diet: Regular Diet





Discharge Diet


Recommended Diet:  Regular Diet





Procedures


Procedures Performed:  


NONE>





Pending Studies


Studies pending at discharge:  no





Medical Emergencies








.


Who to Call and When:





Medical Emergencies:  If at any time you feel your situation is an emergency, 

please call 911 immediately.





.





Non-Emergent Contact


Non-Emergency issues call your:  Primary Care Provider





.


.





"Provider Documentation" section prepared by Bipin Crawford.





VTE Core Measure


Inpt VTE Proph given/why not?:  T.E.D. Stockings, MENDEZ's

## 2017-03-23 NOTE — DISCHARGE SUMMARY
Discharge Summary


Date of Service


Mar 23, 2017.





Discharge Summary


Admission Date:


Mar 23, 2017 at 01:56


Discharge Date:  Mar 23, 2017


Discharge Disposition:  Home


Principal Diagnosis:  Abdominal pain


Problems/Secondary Diagnoses:


Sickle cell disease/cholelithiasis


Procedures:


None.


Consultations:


Hematology, Dr. Diaz





Medication Reconciliation


New Medications:  


Hydroxyurea (Hydrea Cap) 500 Mg Cap


500 MG PO DAILY for 30 Days, #30 CAP NS





Oxycodone/Acetaminophen 10MG/325MG (Percocet 10MG/325MG)  Tab


1 TAB PO AS DIRECTED PRN for Pain for 10 Days, #20 TAB NS











Discharge Exam


A 10 system review was performed and all were negative. 





GEN: Awake, alert, and oriented x 3. Not in acute distress


HEENT: Tm's intact, no inflammation, EOMI, PERRLA, MMM


Neck: Soft, supple


Lungs: CTA b/l, no r/r/w


Heart: REG, nrl S1S2 without murmurs, rubs or gallops


Abdomen: Soft, NT, ND, + BS


EXT: No C/C/E


NEURO: CN's II-XII grossly intact, non-focal


Skin: warm, dry, no rashes


PSYCH: pleasant, cooperative, no signs of significant anxiety or depression.





Hospital Course


This 21 year old male with a history of sickle cell disease and a partial 

splenic infarction had sudden onset of severe abdominal pain that started in 

the right upper quadrant and then moved across his abdomen to the left upper (

the area that he has pain with sickle disease).  In the ED he was given dose of 

IV Morphine and became hypoxemic which was treated with dose of Narcan.  He did 

well through the night.  This am he had no pain at all.  Hematology evaluated 

the patient and the patient was started on hydroxyurea.  I doubt that his pain 

was from sickle crisis.  Interestingly, his CT scan of the abdomen/pelvis did 

show cholelithiasis.  Perhaps his pain was related to this or gastroenteritis. 

None-the-less, he was feeling well and was very motivated to return to class 

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  I did give him a script for oxycodone/

APAP which he had at home in [facility] to use prn. He will follow up with Dr. Diaz in 2 weeks.


Total Time Spent:  Greater than 30 minutes


This includes examination of the patient, discharge planning, medication 

reconciliation, and communication with other providers.





Discharge Instructions


Please refer to the electronic Patient Visit Report (Discharge Instructions) 

for additional information.





Follow-Up


PCP - 5-7 days





Hematology, Dr. Diaz - in 2 weeks.

## 2017-03-23 NOTE — HISTORY AND PHYSICAL
History & Physical


Date & Time of Service:


Mar 23, 2017 at 02:02


Chief Complaint:


PAIN


Primary Care Physician:


Wale Diaz D.O.


History of Present Illness


The patient is a 21-year-old male with sickle cell anemia who is an 

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  He was recently admitted 

in January and found to have a splenic infarct after flying back from [facility] and 

was dehydrated.  He reports ascites been getting abdominal pain monthly when 

she will just take oxycodone for annual resolved.  He developed severe sudden 

onset pain today, this is located in the epigastric region, was associated with 

shortness of breath, and he felt he "felt like he was going to die" so he came 

to the ED.  He was given 6 mg of IV morphine, this was where he would've 

received previously, but then his saturations dropped and he ended up needing 

Narcan.  Currently he reports his pain is better controlled.  He denies feeling 

short of breath anymore.  He was meant to have an appointment with Dr. Diaz 

tomorrow at 8:30 to discuss going on hydroxyurea.





Past Medical/Surgical History


Medical Problems:


(1) Sickle cell anemia


Status: Chronic  











Family History





Diabetes mellitus


FH: heart disease


Hypertension


Kidney disease





Social History


Smoking Status:  Current Every Day Smoker (has stopped over the last week)


Smokeless Tobacco Use:  No


Alcohol Use:  none


Housing status:  lives with friends


Occupational Status:  [employer] student





Allergies


Coded Allergies:  


     No Known Allergies (Unverified , 1/8/17)





Home Medications


No Active Prescriptions or Reported Meds





Review of Systems


See HPI for pertinent positives & negatives. A total of 10 systems reviewed and 

were otherwise negative.





Physical Exam


Vital Signs











  Date Time  Temp Pulse Resp B/P Pulse Ox O2 Delivery O2 Flow Rate FiO2


 


3/23/17 00:35  62 16 112/81 100 Nasal Cannula 2.0 


 


3/22/17 22:44 36.4 64 22 111/74 97 Room Air  








GENERAL: Awake, alert, well-appearing, in no acute distress


HENT: Normocephalic, atraumatic. Oropharynx unremarkable.


EYES: Normal conjunctiva. Sclera non-icteric.


NECK: Supple. No nuchal rigidity. FROM. No JVD.


RESPIRATORY: Clear to auscultation.


CARDIAC: Regular rate, normal rhythm. Extremities warm and well perfused. 

Pulses equal.


ABDOMEN: Soft, non-distended. Mild epigastric tenderness to palpation. No 

rebound or guarding. No masses.


RECTAL: Deferred.


MUSCULOSKELETAL: Chest examination reveals no tenderness. The back is 

symmetrical on inspection without obvious abnormality. There is no CVA 

tenderness to palpation. No joint edema. 


LOWER EXTREMITIES: Calves are equal size bilaterally and non-tender. No edema. 

No discoloration. 


NEURO: Normal sensorium. No sensory or motor deficits noted. 


SKIN: No rash or jaundice noted.





Diagnostics


Laboratory Results





Results Past 24 Hours








Test


  3/22/17


22:55 Range/Units


 


 


White Blood Count 9.11 4.8-10.8  K/uL


 


Red Blood Count 4.71 4.7-6.1  M/uL


 


Hemoglobin 11.9 14.0-18.0  g/dL


 


Hematocrit 33.4 42-52  %


 


Mean Corpuscular Volume 70.9   fL


 


Mean Corpuscular Hemoglobin 25.3 25-34  pg


 


Mean Corpuscular Hemoglobin


Concent 35.6


  32-36  g/dl


 


 


Platelet Count 271 130-400  K/uL


 


Neutrophils (%) (Auto) 34.6  %


 


Lymphocytes (%) (Auto) 46.0  %


 


Monocytes (%) (Auto) 13.2  %


 


Eosinophils (%) (Auto) 5.5  %


 


Basophils (%) (Auto) 0.5  %


 


Neutrophils # (Auto) 3.15 1.4-6.5  K/uL


 


Lymphocytes # (Auto) 4.19 1.2-3.4  K/uL


 


Monocytes # (Auto) 1.20 0.11-0.59  K/uL


 


Eosinophils # (Auto) 0.50 0-0.5  K/uL


 


Basophils # (Auto) 0.05 0-0.2  K/uL


 


RDW Standard Deviation 41.5 36.4-46.3  fL


 


RDW Coefficient of Variation 16.0 11.5-14.5  %


 


Immature Granulocyte % (Auto) 0.2  %


 


Immature Granulocyte # (Auto) 0.02 0.00-0.02  K/uL


 


Absolute Reticulocyte Count


  0.11


  0.02-0.10


10^6/uL


 


Percent Reticulocyte Count 2.4 0.5-2.0  %


 


Sodium Level 144 136-145  mmol/L


 


Potassium Level 3.5 3.5-5.1  mmol/L


 


Chloride Level 105   mmol/L


 


Carbon Dioxide Level 30 21-32  mmol/L


 


Anion Gap 9.0 3-11  mmol/L


 


Blood Urea Nitrogen 9 7-18  mg/dl


 


Creatinine


  0.68


  0.60-1.40


mg/dl


 


Est Creatinine Clear Calc


Drug Dose 120.9


   ml/min


 


 


Estimated GFR (


American) > 150.0


   


 


 


Estimated GFR (Non-


American 138.5


   


 


 


BUN/Creatinine Ratio 12.5 10-20  


 


Random Glucose 114 70-99  mg/dl


 


Calcium Level 8.8 8.5-10.1  mg/dl


 


Total Bilirubin 3.3 0.2-1  mg/dl


 


Direct Bilirubin 0.5 0-0.2  mg/dl


 


Aspartate Amino Transf


(AST/SGOT) 48


  15-37  U/L


 


 


Alanine Aminotransferase


(ALT/SGPT) 44


  12-78  U/L


 


 


Alkaline Phosphatase 51   U/L


 


Total Protein 8.3 6.4-8.2  gm/dl


 


Albumin 4.5 3.4-5.0  gm/dl


 


Lipase 92   U/L











Diagnostic Radiology


CT Abdomen and Pelvis: 





Comparison is made to CT dated 1/8/17.





There is minimal patchy groundglass opacity in the dependent right lower lobe.





There is periportal edema, increased from prior exam.  This can be seen in the 

setting of aggressive IV hydration in addition to pathological causes.  The 

gallbladder, pancreas, adrenal glands, and kidneys are within normal limits.





There is splenomegaly measuring 13.4 cm, decreased from 17 cm previously.  

Again noted are regions of infarction involving the inferior and medial spleen.

  The proportion of splenic tissue which enhances normally has increased from 

prior exam, no exceeding 50%.  There is no perisplenic fluid.





There is no evidence of small or large bowel obstruction.  Multiple fluid-

filled loops of small bowel are noted and may represent a nonspecific 

enteritis.  The appendix is not clearly visualized, but there are no secondary 

signs of acute appendicitis.





Urinary bladder and prostate are unremarkable.





There are no acute osseous findings.  There is stable mild H-shaped 

configuration of vertebral bodies, compatible with sickle cell disease.





Radiologist: Shavonne Sahni MD            Phone: [phone]





Study ready at 0041 and initial results transmitted at 0059





Impression


Assessment and Plan


20 yo M with sickle cell with acute abdominal pain, likely from enteritis





Abdominal pain


- Toradol 15mg q4h 


- Morphine 1-2mg q4h PRN


- IV fluids 


- Regular diet 





Sickle cell


- Consult Dr Diaz





Level of Care


Med/Surg





VTE Prophylaxis


VTE Risk Assessment Done? Y/N:  Yes


Risk Level:  Moderate





Resident Tracking


Resident Involvement:  Resident Care Provided


Care Provided:  Adult [facility] Medicine





Assessment and Plan


Attending Addendum:





I have physically seen and examined this patient, have directed their medical 

care, have supervised the medical residents activities, and agree with the H&P 

as noted above, with the following changes: 





The patient is awake, well-developed and adequately nourished, alert and 

oriented 3, normocephalic and atraumatic, lying in bed and in no acute 

distress.


HEENT--PERRL, EOMI, mucous membranes  and oropharynx dry.


Neck--supple, no JVD or bruits, thyroid normal, trachea midline, no adenopathy.


Heart--normal S1 and S2, no extra beats, no murmurs, rubs or gallops.


Lungs--clear bilaterally with good air movement, no respiratory distress, no 

accessory muscle use.


Abdomen--normal bowel sounds and soft, nontender and nondistended, no hernias 

or masses,  no organomegaly.


Extremities--no cyanosis, clubbing or edema. There are good distal pulses b/l.


Dermatologic--normal skin turgor, normal color, warm and dry, no abnormal lymph 

nodes, no rash.


Neurologic--cranial nerves II through XII grossly intact, motor and sensory 

examination normal.


Rheumatologic--normal range of motion, nontender, muscles and joints.


Psychiatric--normal affect.





Assessment and Plan:





Sickle cell disease/acute abdominal pain/history of splenic infarcts/enteritis--

the patient be admitted to the hospital.  He'll be allowed a regular diet, IV 

fluids at 100 mils per hour, Zofran 4 mg IV every 6 hours when necessary, 

Protonix 40 mg IV daily, Toradol 15 mg IV every 4 hours when necessary, and 

morphine sulfate 12 mg IV every 4 hours when necessary.  We'll consult his 

hematologist Dr. Diaz for further management.

## 2017-03-23 NOTE — DIAGNOSTIC IMAGING REPORT
ABDOMEN AND PELVIS CT WITH IV CONTRAST



CT DOSE: 265.49 mGy.cm



HISTORY:      eval for splenic disease, upper abdominal pain.



TECHNIQUE: Multiaxial CT images of the abdomen and pelvis were performed

following the use of intravenous contrast.



COMPARISON STUDY: Abdomen and pelvis CT 1/8/2017.



FINDINGS: The heart remains top normal in size. Faint groundglass densities

within the right lower lobe posteriorly. These are new from the prior study. The

left lung base is clear. No pneumoperitoneum. No pneumatosis. No fractures

within the visualized osseous structures. Mild periportal edema which could be

due to overhydration. No hepatic masses. The adrenal glands and pancreas are

unremarkable. There is a small gallstone. The kidneys enhance normally. No

hydronephrosis. No retroperitoneal lymphadenopathy. Mild mesenteric

lymphadenopathy persists. Normal bladder. No bowel wall thickening or

obstruction. Normal appendix. The spleen is decreased in size and now measures

13 cm in length, previously measuring 17 cm. The decreased enhancement within

the inferior and medial aspect of the spleen is consistent with a splenic

infarct. This has improved. Moderately distended debris-filled stomach. This

remains unchanged.



IMPRESSION: 



1. Decrease in size of the spleen with improvement in the splenic infarct.

2. Faint patchy groundglass airspace opacities within the right lower lobe

posteriorly. This could represent atelectasis, a small area of pneumonia, or

acute chest syndrome given the patient's history of sickle cell disease.

3. Cholelithiasis.

4. No bowel wall thickening or obstruction.

5. Mild mesenteric lymphadenopathy, unchanged.

6. Mild periportal edema which could be due to overhydration. 







Electronically signed by:  Loc Sutton M.D.

3/23/2017 7:48 AM



Dictated Date/Time:  3/23/2017 7:42 AM

## 2017-03-25 ENCOUNTER — HOSPITAL ENCOUNTER (INPATIENT)
Dept: HOSPITAL 45 - C.EDB | Age: 22
LOS: 6 days | Discharge: HOME | DRG: 418 | End: 2017-03-31
Attending: HOSPITALIST | Admitting: HOSPITALIST
Payer: COMMERCIAL

## 2017-03-25 VITALS
HEIGHT: 64 IN | HEIGHT: 64 IN | BODY MASS INDEX: 18.25 KG/M2 | BODY MASS INDEX: 18.25 KG/M2 | WEIGHT: 106.92 LBS | WEIGHT: 106.92 LBS

## 2017-03-25 DIAGNOSIS — D57.1: ICD-10-CM

## 2017-03-25 DIAGNOSIS — E87.6: ICD-10-CM

## 2017-03-25 DIAGNOSIS — K81.2: ICD-10-CM

## 2017-03-25 DIAGNOSIS — E83.42: ICD-10-CM

## 2017-03-25 DIAGNOSIS — F17.200: ICD-10-CM

## 2017-03-25 DIAGNOSIS — Z83.3: ICD-10-CM

## 2017-03-25 DIAGNOSIS — Z82.49: ICD-10-CM

## 2017-03-25 DIAGNOSIS — Z84.1: ICD-10-CM

## 2017-03-25 DIAGNOSIS — D56.4: ICD-10-CM

## 2017-03-25 DIAGNOSIS — K85.10: Primary | ICD-10-CM

## 2017-03-25 DIAGNOSIS — R17: ICD-10-CM

## 2017-03-25 LAB
ALP SERPL-CCNC: 95 U/L (ref 45–117)
ALT SERPL-CCNC: 159 U/L (ref 12–78)
ANION GAP SERPL CALC-SCNC: 10 MMOL/L (ref 3–11)
ANISOCYTOSIS BLD QL SMEAR: PRESENT
AST SERPL-CCNC: 82 U/L (ref 15–37)
BASOPHILS # BLD: 0.02 K/UL (ref 0–0.2)
BASOPHILS NFR BLD: 0.1 %
BUN SERPL-MCNC: 8 MG/DL (ref 7–18)
BUN/CREAT SERPL: 10 (ref 10–20)
CALCIUM SERPL-MCNC: 8.8 MG/DL (ref 8.5–10.1)
CHLORIDE SERPL-SCNC: 97 MMOL/L (ref 98–107)
CO2 SERPL-SCNC: 28 MMOL/L (ref 21–32)
COMPLETE: YES
CREAT CL PREDICTED SERPL C-G-VRATE: 103.2 ML/MIN
CREAT SERPL-MCNC: 0.79 MG/DL (ref 0.6–1.4)
EOSINOPHIL NFR BLD AUTO: 261 K/UL (ref 130–400)
GLUCOSE SERPL-MCNC: 98 MG/DL (ref 70–99)
HCT VFR BLD CALC: 34.7 % (ref 42–52)
IG%: 0.4 %
IMM GRANULOCYTES NFR BLD AUTO: 6.8 %
LYMPHOCYTES # BLD: 1.05 K/UL (ref 1.2–3.4)
MACROCYTES BLD QL SMEAR: PRESENT
MCH RBC QN AUTO: 24.9 PG (ref 25–34)
MCHC RBC AUTO-ENTMCNC: 35.7 G/DL (ref 32–36)
MCV RBC AUTO: 69.8 FL (ref 80–100)
MICROCYTES BLD QL SMEAR: PRESENT
MONOCYTES NFR BLD: 9 %
NEUTROPHILS # BLD AUTO: 0.4 %
NEUTROPHILS NFR BLD AUTO: 83.3 %
OVALOCYTES BLD QL SMEAR: (no result)
POIKILOCYTOSIS BLD QL SMEAR: PRESENT
POLYCHROMASIA BLD QL SMEAR: (no result)
POTASSIUM SERPL-SCNC: 3.5 MMOL/L (ref 3.5–5.1)
RBC # BLD AUTO: 4.97 M/UL (ref 4.7–6.1)
SCHISTOCYTES BLD QL SMEAR: (no result)
SODIUM SERPL-SCNC: 135 MMOL/L (ref 136–145)
WBC # BLD AUTO: 15.35 K/UL (ref 4.8–10.8)

## 2017-03-25 RX ADMIN — FENTANYL CITRATE PRN MCG: 50 INJECTION, SOLUTION INTRAMUSCULAR; INTRAVENOUS at 21:54

## 2017-03-25 RX ADMIN — FENTANYL CITRATE PRN MCG: 50 INJECTION, SOLUTION INTRAMUSCULAR; INTRAVENOUS at 22:50

## 2017-03-25 NOTE — DIAGNOSTIC IMAGING REPORT
ULTRASOUND RIGHT UPPER QUADRANT ABDOMEN



CLINICAL HISTORY: Right upper quadrant abdominal pain.



COMPARISON STUDY: Abdominal CT dated 3/23/2017.



TECHNIQUE: Real-time, grayscale, and color flow sonography of the right upper

quadrant of the abdomen was performed. Images are reviewed in the transverse and

longitudinal planes.



FINDINGS:



Liver: The liver is normal in size and echotexture. There is no intrahepatic

biliary ductal dilatation. The main portal vein is patent.



Gallbladder: The gallbladder is distended and filled with sludge and stones. The

gallbladder wall is thickened and hyperemic measuring up to 4 mm. There is a

small volume of free fluid in the right upper quadrant. A sonographic Butts's

sign is reportedly present. The common bile duct measures up to 0.9 cm in

diameter.



Pancreas: Not well visualized due to overlying bowel gas.



Right kidney: Survey images of the right kidney demonstrate normal size and

echotexture. There is no hydronephrosis.



Ascites: A small volume of free fluid is seen in the right upper quadrant.





IMPRESSION: Cholelithiasis and biliary sludge with sonographic evidence of acute

cholecystitis. Surgical consultation is advised.







Electronically signed by:  Nura Thomas M.D.

3/25/2017 10:39 PM



Dictated Date/Time:  3/25/2017 10:37 PM

## 2017-03-26 VITALS
HEART RATE: 92 BPM | OXYGEN SATURATION: 95 % | DIASTOLIC BLOOD PRESSURE: 64 MMHG | TEMPERATURE: 99.14 F | SYSTOLIC BLOOD PRESSURE: 123 MMHG

## 2017-03-26 VITALS
SYSTOLIC BLOOD PRESSURE: 116 MMHG | TEMPERATURE: 98.96 F | HEART RATE: 90 BPM | OXYGEN SATURATION: 96 % | DIASTOLIC BLOOD PRESSURE: 70 MMHG

## 2017-03-26 VITALS — SYSTOLIC BLOOD PRESSURE: 125 MMHG | HEART RATE: 102 BPM | TEMPERATURE: 98.6 F | DIASTOLIC BLOOD PRESSURE: 91 MMHG

## 2017-03-26 VITALS
DIASTOLIC BLOOD PRESSURE: 78 MMHG | OXYGEN SATURATION: 97 % | TEMPERATURE: 98.6 F | SYSTOLIC BLOOD PRESSURE: 121 MMHG | HEART RATE: 98 BPM

## 2017-03-26 VITALS
OXYGEN SATURATION: 100 % | SYSTOLIC BLOOD PRESSURE: 125 MMHG | TEMPERATURE: 98.6 F | HEART RATE: 102 BPM | DIASTOLIC BLOOD PRESSURE: 91 MMHG

## 2017-03-26 LAB
ALBUMIN/GLOB SERPL: 1.1 {RATIO} (ref 0.9–2)
ALP SERPL-CCNC: 85 U/L (ref 45–117)
ALT SERPL-CCNC: 110 U/L (ref 12–78)
ANION GAP SERPL CALC-SCNC: 10 MMOL/L (ref 3–11)
AST SERPL-CCNC: 51 U/L (ref 15–37)
BASOPHILS # BLD: 0.01 K/UL (ref 0–0.2)
BASOPHILS NFR BLD: 0.1 %
BUN SERPL-MCNC: 5 MG/DL (ref 7–18)
BUN/CREAT SERPL: 7 (ref 10–20)
CALCIUM SERPL-MCNC: 8.2 MG/DL (ref 8.5–10.1)
CHLORIDE SERPL-SCNC: 100 MMOL/L (ref 98–107)
CO2 SERPL-SCNC: 27 MMOL/L (ref 21–32)
COMPLETE: YES
CREAT CL PREDICTED SERPL C-G-VRATE: 123.5 ML/MIN
CREAT SERPL-MCNC: 0.66 MG/DL (ref 0.6–1.4)
EOSINOPHIL NFR BLD AUTO: 222 K/UL (ref 130–400)
GLOBULIN SER-MCNC: 3.3 GM/DL (ref 2.5–4)
GLUCOSE SERPL-MCNC: 79 MG/DL (ref 70–99)
HCT VFR BLD CALC: 31.8 % (ref 42–52)
IG%: 0.4 %
IMM GRANULOCYTES NFR BLD AUTO: 8.3 %
LYMPHOCYTES # BLD: 1.17 K/UL (ref 1.2–3.4)
MCH RBC QN AUTO: 25.4 PG (ref 25–34)
MCHC RBC AUTO-ENTMCNC: 36.8 G/DL (ref 32–36)
MCV RBC AUTO: 69 FL (ref 80–100)
MICROCYTES BLD QL SMEAR: PRESENT
MONOCYTES NFR BLD: 9 %
NEUTROPHILS # BLD AUTO: 0.3 %
NEUTROPHILS NFR BLD AUTO: 81.9 %
POLYCHROMASIA BLD QL SMEAR: (no result)
POTASSIUM SERPL-SCNC: 3.5 MMOL/L (ref 3.5–5.1)
RBC # BLD AUTO: 4.61 M/UL (ref 4.7–6.1)
SODIUM SERPL-SCNC: 137 MMOL/L (ref 136–145)
TARGETS BLD QL SMEAR: (no result)
WBC # BLD AUTO: 14.04 K/UL (ref 4.8–10.8)

## 2017-03-26 RX ADMIN — MORPHINE SULFATE PRN MG: 2 INJECTION, SOLUTION INTRAMUSCULAR; INTRAVENOUS at 07:56

## 2017-03-26 RX ADMIN — FENTANYL CITRATE PRN MCG: 50 INJECTION, SOLUTION INTRAMUSCULAR; INTRAVENOUS at 01:08

## 2017-03-26 RX ADMIN — FENTANYL CITRATE PRN MCG: 50 INJECTION, SOLUTION INTRAMUSCULAR; INTRAVENOUS at 00:20

## 2017-03-26 RX ADMIN — PIPERACILLIN AND TAZOBACTAM SCH MLS/HR: 3; .375 INJECTION, POWDER, LYOPHILIZED, FOR SOLUTION INTRAVENOUS; PARENTERAL at 19:53

## 2017-03-26 RX ADMIN — MORPHINE SULFATE PRN MG: 2 INJECTION, SOLUTION INTRAMUSCULAR; INTRAVENOUS at 12:24

## 2017-03-26 RX ADMIN — PANTOPRAZOLE SODIUM SCH MLS/MIN: 40 INJECTION, POWDER, FOR SOLUTION INTRAVENOUS at 10:41

## 2017-03-26 RX ADMIN — SODIUM CHLORIDE, SODIUM LACTATE, POTASSIUM CHLORIDE, AND CALCIUM CHLORIDE SCH MLS/HR: 600; 310; 30; 20 INJECTION, SOLUTION INTRAVENOUS at 10:00

## 2017-03-26 RX ADMIN — SODIUM CHLORIDE, SODIUM LACTATE, POTASSIUM CHLORIDE, AND CALCIUM CHLORIDE SCH MLS/HR: 600; 310; 30; 20 INJECTION, SOLUTION INTRAVENOUS at 18:33

## 2017-03-26 RX ADMIN — SODIUM CHLORIDE, SODIUM LACTATE, POTASSIUM CHLORIDE, AND CALCIUM CHLORIDE SCH MLS/HR: 600; 310; 30; 20 INJECTION, SOLUTION INTRAVENOUS at 14:30

## 2017-03-26 RX ADMIN — MORPHINE SULFATE PRN MG: 2 INJECTION, SOLUTION INTRAMUSCULAR; INTRAVENOUS at 10:10

## 2017-03-26 RX ADMIN — PIPERACILLIN AND TAZOBACTAM SCH MLS/HR: 3; .375 INJECTION, POWDER, LYOPHILIZED, FOR SOLUTION INTRAVENOUS; PARENTERAL at 03:50

## 2017-03-26 RX ADMIN — HYDROMORPHONE HYDROCHLORIDE PRN MG: 1 INJECTION, SOLUTION INTRAMUSCULAR; INTRAVENOUS; SUBCUTANEOUS at 21:24

## 2017-03-26 RX ADMIN — PIPERACILLIN AND TAZOBACTAM SCH MLS/HR: 3; .375 INJECTION, POWDER, LYOPHILIZED, FOR SOLUTION INTRAVENOUS; PARENTERAL at 11:39

## 2017-03-26 RX ADMIN — MORPHINE SULFATE PRN MG: 2 INJECTION, SOLUTION INTRAMUSCULAR; INTRAVENOUS at 05:02

## 2017-03-26 RX ADMIN — MORPHINE SULFATE PRN MG: 2 INJECTION, SOLUTION INTRAMUSCULAR; INTRAVENOUS at 14:30

## 2017-03-26 RX ADMIN — HYDROMORPHONE HYDROCHLORIDE PRN MG: 1 INJECTION, SOLUTION INTRAMUSCULAR; INTRAVENOUS; SUBCUTANEOUS at 23:21

## 2017-03-26 RX ADMIN — HYDROMORPHONE HYDROCHLORIDE PRN MG: 1 INJECTION, SOLUTION INTRAMUSCULAR; INTRAVENOUS; SUBCUTANEOUS at 19:20

## 2017-03-26 RX ADMIN — SODIUM CHLORIDE, SODIUM LACTATE, POTASSIUM CHLORIDE, AND CALCIUM CHLORIDE SCH MLS/HR: 600; 310; 30; 20 INJECTION, SOLUTION INTRAVENOUS at 21:24

## 2017-03-26 NOTE — PHARMACY PROGRESS NOTE
Pharmacy Antibiotic Consult


Date of Service:


Mar 26, 2017.


Pharmacy Dosing Scope


Pharmacy is consulted to initiate Vancomycin IV dosing therapy, order 

appropriate labs and adjust drug dose/frequency.





Subjective


The patient is a 21 year old male admitted on Mar 26, 2017 at 01:30.





Objective


Height (Feet):  5


Height (Inches):  4.00


Weight (Kilograms):  48.500


Lab Results (24hrs):





Laboratory Tests








Test


  3/25/17


21:16


 


BUN/Creatinine Ratio 10.0 


 


Blood Urea Nitrogen 8 mg/dl 


 


Creatinine 0.79 mg/dl 


 


White Blood Count 15.35 K/uL 


 


Red Blood Count 4.97 M/uL 


 


Hemoglobin 12.4 g/dL 


 


Hematocrit 34.7 % 


 


Mean Corpuscular Volume 69.8 fL 


 


Mean Corpuscular Hemoglobin 24.9 pg 


 


Mean Corpuscular Hemoglobin


Concent 35.7 g/dl 


 


 


Platelet Count 261 K/uL 


 


Neutrophils (%) (Auto) 83.3 % 


 


Lymphocytes (%) (Auto) 6.8 % 


 


Monocytes (%) (Auto) 9.0 % 


 


Eosinophils (%) (Auto) 0.4 % 


 


Basophils (%) (Auto) 0.1 % 


 


Neutrophils # (Auto) 12.78 K/uL 


 


Lymphocytes # (Auto) 1.05 K/uL 


 


Monocytes # (Auto) 1.38 K/uL 


 


Eosinophils # (Auto) 0.06 K/uL 


 


Basophils # (Auto) 0.02 K/uL 











Recent Pertinent Medications


* Patient is also receiving Zosyn 3.375mg IV every hours





Assessment & Plan


* Loading dose: 1200 mg IV (~ 25mg/kg) X 1 dose then 600mg IV (~12.3mg/kg) 

every 8 hours.





* Goal trough level estimate:  between 16-20 mcg/mL.





* Trough level is ordered for 0330 on 03/27/17 just prior to the 4th dose.














Pharmacy will continue to follow and will adjust dose/frequency as necessary.  

Thank you

## 2017-03-26 NOTE — HISTORY AND PHYSICAL
History & Physical


Date & Time of Service:


Mar 26, 2017 at 04:50


Chief Complaint:


Acute Cholecystitis, Acute Gallstone Pancreatitis


Primary Care Physician:


[facility]


History of Present Illness


Source:  patient


The patient is a 21-year-old male who was admitted and discharged on March 23 

due to severe abdominal pain with unclear etiology at that time.  The pain was 

initially thought possibly secondary to sickle cell, but determined to not 

likely be so.  When he was discharged home that day he felt improved, however, 

last night he began to develop generalized abdominal pain again, this time 

radiated into his back, accompanied by nausea and vomiting.  His urine has 

become more dark has not been urinating as frequently.  He has been taking 

oxycodone was not helping the pain.





Past Medical/Surgical History


Medical Problems:


(1) Sickle cell anemia


Status: Chronic  











Family History





Diabetes mellitus


FH: heart disease


Hypertension


Kidney disease





Social History


Smoking Status:  Light Tobacco Smoker


Smokeless Tobacco Use:  No


Alcohol Use:  none


Marital Status:  single


Housing status:  lives with friends


Occupational Status:  [employer] student





Multi-Drug Resistant Organisms


History of MDRO:  No





Allergies


Coded Allergies:  


     No Known Allergies (Unverified , 1/8/17)





Home Medications


Scheduled


Hydroxyurea (Hydrea Cap), 500 MG PO DAILY





Scheduled PRN


Ondansetron Hcl (Zofran), 4 MG PO Q6H PRN for Nausea


Oxycodone/Acetaminophen 10MG/325MG (Percocet 10MG/325MG), 1 TAB PO AS DIRECTED 

PRN for Pain





Review of Systems


The patient denies chest pain, palpitations, shortness of breath, cough, lower 

extremity swelling, vision change, hearing change, sore throat, blood in urine 

or stool, dysuria, urinary frequency or urgency, lightheadedness, dizziness, 

headache, memory loss, rash, abnormal bruising or bleeding, imbalance, focal or 

generalized weakness, numbness or tingling in arms or legs, arthralgias or 

myalgias, neck pain, night sweats, or allergy symptoms.


The review of systems is otherwise negative other than for that already noted 

above, and at least 10 systems have been reviewed.





Physical Exam


Vital Signs











  Date Time  Temp Pulse Resp B/P Pulse Ox O2 Delivery O2 Flow Rate FiO2


 


3/26/17 03:00 37.0 102 16 125/91 100 Room Air  


 


3/26/17 02:50 37.0 102 16 125/91  Room Air  


 


3/26/17 01:56 37.1 118 16 117/68 95   


 


3/26/17 01:34  118 16  95   


 


3/26/17 01:30    117/68    


 


3/26/17 01:04  102 19  99   


 


3/26/17 01:00    120/74    


 


3/26/17 00:34  72 17  97   


 


3/26/17 00:30    109/67    


 


3/26/17 00:24  72 20 109/61 96 Room Air  


 


3/26/17 00:04  73 22  99   


 


3/26/17 00:00    109/61    


 


3/25/17 23:37  81 20 110/68 97 Room Air  


 


3/25/17 23:34  86 20  98   


 


3/25/17 23:30    110/68    


 


3/25/17 23:04  93 20  97   


 


3/25/17 23:00    117/66    


 


3/25/17 22:55  93 20 120/81 96 Room Air  


 


3/25/17 22:51    120/81    


 


3/25/17 22:04  72 18  100   


 


3/25/17 22:00    131/90    


 


3/25/17 21:57  77 20 127/87 97 Nasal Cannula 2.0 


 


3/25/17 21:56     97 Nasal Cannula 2.0 


 


3/25/17 21:56     86 Room Air  


 


3/25/17 21:34  94 23  100   


 


3/25/17 21:30    127/87    


 


3/25/17 21:29  87 20 127/87 100 Room Air  


 


3/25/17 21:29  100      


 


3/25/17 21:06 37.1 108 20 121/72 98 Room Air  








The patient is awake,  alert and oriented 3, normocephalic and atraumatic, 

sometimes rolling back and forth on the bed, in moderate acute distress 

secondary to abdominal pain as the pain medication wears off.


HEENT--PERRL, EOMI, mucous membranes  and oropharynx dry.


Neck--supple, no JVD or bruits, thyroid normal, trachea midline, no adenopathy.


Heart--normal S1 and S2, no extra beats, no murmurs, rubs or gallops.


Lungs--clear bilaterally but diminished, no respiratory distress, no accessory 

muscle use.


Abdomen--decreased bowel sounds, soft, mildly distended, generalized discomfort.


Extremities--no cyanosis, clubbing or edema. There are good distal pulses b/l.


Dermatologic--normal skin turgor, normal color, warm and dry, no abnormal lymph 

nodes, no rash.


Neurologic--cranial nerves II through XII grossly intact.


Rheumatologic--normal range of motion, nontender, muscles and joints.


Psychiatric--in pain.





Diagnostics


Laboratory Results





Results Past 24 Hours








Test


  3/25/17


21:16 Range/Units


 


 


White Blood Count 15.35 4.8-10.8  K/uL


 


Red Blood Count 4.97 4.7-6.1  M/uL


 


Hemoglobin 12.4 14.0-18.0  g/dL


 


Hematocrit 34.7 42-52  %


 


Mean Corpuscular Volume 69.8   fL


 


Mean Corpuscular Hemoglobin 24.9 25-34  pg


 


Mean Corpuscular Hemoglobin


Concent 35.7


  32-36  g/dl


 


 


Platelet Count 261 130-400  K/uL


 


Neutrophils (%) (Auto) 83.3  %


 


Lymphocytes (%) (Auto) 6.8  %


 


Monocytes (%) (Auto) 9.0  %


 


Eosinophils (%) (Auto) 0.4  %


 


Basophils (%) (Auto) 0.1  %


 


Neutrophils # (Auto) 12.78 1.4-6.5  K/uL


 


Lymphocytes # (Auto) 1.05 1.2-3.4  K/uL


 


Monocytes # (Auto) 1.38 0.11-0.59  K/uL


 


Eosinophils # (Auto) 0.06 0-0.5  K/uL


 


Basophils # (Auto) 0.02 0-0.2  K/uL


 


RDW Standard Deviation 40.2 36.4-46.3  fL


 


RDW Coefficient of Variation 16.0 11.5-14.5  %


 


Immature Granulocyte % (Auto) 0.4  %


 


Immature Granulocyte # (Auto) 0.06 0.00-0.02  K/uL


 


Polychromasia 1+  


 


Poikilocytosis PRESENT  


 


Anisocytosis PRESENT  


 


Microcytosis PRESENT  


 


Macrocytosis PRESENT  


 


Ovalocytes 1+  


 


Schistocytes OCCASIONAL  


 


Sodium Level 135 136-145  mmol/L


 


Potassium Level 3.5 3.5-5.1  mmol/L


 


Chloride Level 97   mmol/L


 


Carbon Dioxide Level 28 21-32  mmol/L


 


Anion Gap 10.0 3-11  mmol/L


 


Blood Urea Nitrogen 8 7-18  mg/dl


 


Creatinine


  0.79


  0.60-1.40


mg/dl


 


Est Creatinine Clear Calc


Drug Dose 103.2


   ml/min


 


 


Estimated GFR (


American) > 150.0


   


 


 


Estimated GFR (Non-


American 130.2


   


 


 


BUN/Creatinine Ratio 10.0 10-20  


 


Random Glucose 98 70-99  mg/dl


 


Calcium Level 8.8 8.5-10.1  mg/dl


 


Total Bilirubin 14.7 0.2-1  mg/dl


 


Direct Bilirubin 9.4 0-0.2  mg/dl


 


Aspartate Amino Transf


(AST/SGOT) 82


  15-37  U/L


 


 


Alanine Aminotransferase


(ALT/SGPT) 159


  12-78  U/L


 


 


Alkaline Phosphatase 95   U/L


 


Total Protein 8.0 6.4-8.2  gm/dl


 


Albumin 4.5 3.4-5.0  gm/dl


 


Lipase 29525   U/L











Diagnostic Radiology





                                                                               

                                                                 


Patient Name: SHASHI BRYANT                               Unit Number:  

[id number]                  


 








 











Dictated: 03/25/17 2237


 


Transcribed: 03/25/17 2237


 


EV


 


Printed Date/Time: [~ rep prt dt]/[~ rep prt tm]








 [~ rep ct labl] - [~ rep ct ivnm]


 





   [facility]


 Radiology Department


 [place] [address]


 [phone]





 











Dictated: 03/25/17 2237


 


Transcribed: 03/25/17 2237


 


EV


 


Printed Date/Time: [~ rep prt dt]/[~ rep prt tm]








 [~ rep ct labl] - [~ rep ct ivnm]


 








[~ rep ct add3]]


ULTRASOUND RIGHT UPPER QUADRANT ABDOMEN





CLINICAL HISTORY: Right upper quadrant abdominal pain.





COMPARISON STUDY: Abdominal CT dated 3/23/2017.





TECHNIQUE: Real-time, grayscale, and color flow sonography of the right upper


quadrant of the abdomen was performed. Images are reviewed in the transverse and


longitudinal planes.





FINDINGS:





Liver: The liver is normal in size and echotexture. There is no intrahepatic


biliary ductal dilatation. The main portal vein is patent.





Gallbladder: The gallbladder is distended and filled with sludge and stones. The


gallbladder wall is thickened and hyperemic measuring up to 4 mm. There is a


small volume of free fluid in the right upper quadrant. A sonographic Butts's


sign is reportedly present. The common bile duct measures up to 0.9 cm in


diameter.





Pancreas: Not well visualized due to overlying bowel gas.





Right kidney: Survey images of the right kidney demonstrate normal size and


echotexture. There is no hydronephrosis.





Ascites: A small volume of free fluid is seen in the right upper quadrant.








IMPRESSION: Cholelithiasis and biliary sludge with sonographic evidence of acute


cholecystitis. Surgical consultation is advised.











Electronically signed by:  Nura Thomas M.D.


3/25/2017 10:39 PM





Dictated Date/Time:  3/25/2017 10:37 PM





 The status of this report is Signed.   


 Draft = Not yet reviewed or approved by Radiologist.  


 Signed = Reviewed and approved by Radiologist.


<AttendingPhy></AttendingPhy> <FamilyPhy>[facility]</FamilyPhy> 

<PrimaryPhy>[facility]</PrimaryPhy> <UnitNumber>[id number]</

UnitNumber> <VisitNumber>[id number]</VisitNumber> <PatientName>PJ BRYANT</PatientName> <DateOfBirth>1995</DateOfBirth> <Location>[facility]</

Location> <ServiceDate>03/25/17</ServiceDate> <MNE>ESINDI</MNE> <OrderingPhy>

Wilmar Sandoval MD</OrderingPhy> <OrderingPhyMNE>f rep ord dr haskins</

OrderingPhyMNE> <DictatingPhyMNE>f rep dict dr haskins</DictatingPhyMNE> <CCListMNE>

f rep ct jozef</CCListMNE> <AdmittingPhyMNE>f pt admit dr haskins</AdmittingPhyMNE> <

AttendingPhyMNE>f pt attend dr haskins</AttendingPhyMNE>


<ConsultingPhyMNE>f pt consult dr haskins</ConsultingPhyMNE> <FamilyPhyMNE>f pt fam 

dr haskins</FamilyPhyMNE> <OtherPhyMNE>f pt other dr haskins</OtherPhyMNE> <

PrimaryPhyMNE>f pt prim care dr haskins</PrimaryPhyMNE> <ReferringPhyMNE>f pt 

referring dr haskins</ReferringPhyMNE>





Impression


Assessment and Plan


Acute cholecystitis/gallstone pancreatitis--patient will be admitted to the 

medical surgical floor and nothing by mouth status.  Start Zosyn 3.375 g IV 

every 6 hours, Zofran 4 mg IV every 6 hours when necessary, Protonix 40 mg IV 

daily, Phenergan 25 mg IV every 6 hours when necessary, normal saline with 

potassium chloride 20 mEq at 25 ML's per hour, and morphine sulfate 2-4 mg IV 

every 2 hours when necessary  Follow serial laboratories.  The emergency 

department as consulted surgery, who asked the patient be seen by 

gastroenterology.  GI consult suggested symptomatic treatment, and the patient'

s pain was not controlled, would come in for decompression.  We'll order an 

MRCP for further assessment.





Sickle cell disease--has been seen by hematologist Dr. Diaz at last admission

, but was not thought to be in crisis at that time.





Level of Care


Med/Surg





Advanced Directives


Existing Advance Directive:  No


Existing Living Will:  No


Existing Power of :  No





Resuscitation Status


FULL RESUSCITATION





VTE Prophylaxis


VTE Risk Assessment Done? Y/N:  Yes


Risk Level:  Low


Given or contraindicated:  SCD's





Social Service Consult


None Apply

## 2017-03-26 NOTE — DIAGNOSTIC IMAGING REPORT
MRCP



CLINICAL HISTORY: Acute cholecystitis. Abdominal pain.    



COMPARISON STUDY:  CT of the abdomen and pelvis March 23, 2017 and right upper

quadrant ultrasound March 25, 2017.



TECHNIQUE: Utilizing a 1.5 Jazmine magnet and heavily T2 weighted sequences,

multiplanar, multi echo imaging of the abdomen was performed without IV

contrast.



FINDINGS: The splenic infarct is similar appearance to CT of March 23, 2017. No

biliary ductal dilatation is identified. No common bile duct calculi are

identified although sensitivity is diminished due to motion artifact. There has

been interval development of a small to moderate amount of fluid within the

abdomen and pelvis since prior abdominal CT. Peripancreatic fluid is noted.

Sludge and stones are noted within the gallbladder. There is mild gallbladder

wall thickening. Trace bilateral pleural effusion are noted. Unenhanced images

of the liver, adrenal glands and kidneys are normal.



IMPRESSION:  



1.  Interval development of abdominal and pelvic ascites with peripancreatic

infiltration. The findings favor acute pancreatitis. Discussed with Dr. Crawford

at time of dictation.



2. No biliary ductal dilatation. No common bile duct calculi identified although

sensitivity diminished due to motion artifact.



3. Cholelithiasis and mild gallbladder wall thickening which may reflect acute

cholecystitis.



4. No change in appearance of the splenic infarct.









Electronically signed by:  David Riojas M.D.

3/26/2017 10:04 AM



Dictated Date/Time:  3/26/2017 8:33 AM

## 2017-03-26 NOTE — EMERGENCY ROOM VISIT NOTE
History


Report prepared by Alisson:  Daniel Mann


Under the Supervision of:  Dr. Wilmar Sandoval M.D.


First contact with patient:  21:17


Chief Complaint:  ABDOMINAL PAIN


Stated Complaint:  ABD PAIN


Nursing Triage Summary:  


pt reports diffuse abdominal pain , reports admission to hosp. last week for dx 


of sickle cell crisis , states this belly pain is the same as last week. ,+ NV 

, 


I used the oxycodone for the pain it is not helping





History of Present Illness


The patient is a 21 year old male who presents to the Emergency Room with 

complaints of diffuse abdominal pain that began last night. He rates his pain 

an 8/10 in severity. His pain is radiating into his back as well. The patient 

was admitted to the hospital over night last week secondary to a sickle cell 

crisis. This is the first time that has happened to him. He states that his 

pain is similar to the same pain he felt at that time. He is also experiencing 

nausea and vomiting. He began having sickle cell problems last December. He has 

been having dark urine as well. He has been taking Oxycodone which has not been 

helping. He also took Zofran PTA. Patient denies LOC, headache, fevers, chills, 

diaphoresis, visual changes, neck pain, chest pain, breathing difficulties, 

melena, hematochezia, , numbness, weakness, lymphadenopathy, rash, or other 

complaints.





   Source of History:  patient


   Onset:  Last night


   Position:  abdomen


   Symptom Intensity:  8/10


   Quality:  sharp


   Timing:  constant


   Associated Symptoms:  + back pain, + nausea, + urinary symptoms, + vomiting





Review of Systems


See HPI for pertinent positives and negatives.  A total of ten systems were 

reviewed and were otherwise negative.





Past Medical & Surgical


Medical Problems:


(1) Acute cholecystitis


(2) Acute gallstone pancreatitis


(3) Elevated bilirubin


(4) Intractable abdominal pain


(5) Sickle cell anemia


(6) Sickle cell anemia








Family History





Diabetes mellitus


FH: heart disease


Hypertension


Kidney disease





Social History


Smoking Status:  Current Every Day Smoker


Alcohol Use:  none


Marital Status:  single


Occupation Status:  [employer] student





Current/Historical Medications


Scheduled


Hydroxyurea (Hydrea Cap), 500 MG PO DAILY





Scheduled PRN


Ondansetron Hcl (Zofran), 4 MG PO Q6H PRN for Nausea


Oxycodone/Acetaminophen 10MG/325MG (Percocet 10MG/325MG), 1 TAB PO AS DIRECTED 

PRN for Pain





Allergies


Coded Allergies:  


     No Known Allergies (Unverified , 1/8/17)





Physical Exam


Vital Signs











  Date Time  Temp Pulse Resp B/P Pulse Ox O2 Delivery O2 Flow Rate FiO2


 


3/26/17 01:56 37.1 118 16 117/68 95   


 


3/26/17 01:34  118 16  95   


 


3/26/17 01:30    117/68    


 


3/26/17 01:04  102 19  99   


 


3/26/17 01:00    120/74    


 


3/26/17 00:34  72 17  97   


 


3/26/17 00:30    109/67    


 


3/26/17 00:24  72 20 109/61 96 Room Air  


 


3/26/17 00:04  73 22  99   


 


3/26/17 00:00    109/61    


 


3/25/17 23:37  81 20 110/68 97 Room Air  


 


3/25/17 23:34  86 20  98   


 


3/25/17 23:30    110/68    


 


3/25/17 23:04  93 20  97   


 


3/25/17 23:00    117/66    


 


3/25/17 22:55  93 20 120/81 96 Room Air  


 


3/25/17 22:51    120/81    


 


3/25/17 22:04  72 18  100   


 


3/25/17 22:00    131/90    


 


3/25/17 21:57  77 20 127/87 97 Nasal Cannula 2.0 


 


3/25/17 21:56     97 Nasal Cannula 2.0 


 


3/25/17 21:56     86 Room Air  


 


3/25/17 21:34  94 23  100   


 


3/25/17 21:30    127/87    


 


3/25/17 21:29  87 20 127/87 100 Room Air  


 


3/25/17 21:29  100      


 


3/25/17 21:06 37.1 108 20 121/72 98 Room Air  











Physical Exam


GENERAL: Awake, alert,  very uncomfortable appearing, in moderate distress


HENT: Normocephalic, atraumatic. Oropharynx unremarkable.


EYES: Normal conjunctiva. Sclera are icteric.


NECK: Supple. No nuchal rigidity. FROM. No JVD.


RESPIRATORY: Clear to auscultation.


CARDIAC: Tachycardic rate, normal rhythm. Extremities warm and well perfused. 

Pulses equal.


ABDOMEN: Soft, non-distended. Diffuse tenderness to palpation. No rebound or 

guarding. No masses.


RECTAL: Deferred.


MUSCULOSKELETAL: Chest examination reveals no tenderness. The back is 

symmetrical on inspection without obvious abnormality. There is no CVA 

tenderness to palpation. No joint edema. 


LOWER EXTREMITIES: Calves are equal size bilaterally and non-tender. No edema. 

No discoloration. 


NEURO: Normal sensorium. No sensory or motor deficits noted. 


SKIN: No rash or jaundice noted.





Medical Decision & Procedures


ER Provider


Diagnostic Interpretation:


X ray results as stated below per my interpretation and radiologist 

interpretation. Other radiology results as stated below per my review and 

radiologist interpretation





ULTRASOUND RIGHT UPPER QUADRANT ABDOMEN





CLINICAL HISTORY: Right upper quadrant abdominal pain.





COMPARISON STUDY: Abdominal CT dated 3/23/2017.





TECHNIQUE: Real-time, grayscale, and color flow sonography of the right upper


quadrant of the abdomen was performed. Images are reviewed in the transverse and


longitudinal planes.





FINDINGS:





Liver: The liver is normal in size and echotexture. There is no intrahepatic


biliary ductal dilatation. The main portal vein is patent.





Gallbladder: The gallbladder is distended and filled with sludge and stones. The


gallbladder wall is thickened and hyperemic measuring up to 4 mm. There is a


small volume of free fluid in the right upper quadrant. A sonographic Butts's


sign is reportedly present. The common bile duct measures up to 0.9 cm in


diameter.





Pancreas: Not well visualized due to overlying bowel gas.





Right kidney: Survey images of the right kidney demonstrate normal size and


echotexture. There is no hydronephrosis.





Ascites: A small volume of free fluid is seen in the right upper quadrant.








IMPRESSION: Cholelithiasis and biliary sludge with sonographic evidence of acute


cholecystitis. Surgical consultation is advised.











Electronically signed by:  Nura Thomas M.D.


3/25/2017 10:39 PM





Dictated Date/Time:  3/25/2017 10:37 PM





Laboratory Results


3/25/17 21:16








Red Blood Count 4.97, Mean Corpuscular Volume 69.8, Mean Corpuscular Hemoglobin 

24.9, Mean Corpuscular Hemoglobin Concent 35.7, Neutrophils (%) (Auto) 83.3, 

Lymphocytes (%) (Auto) 6.8, Monocytes (%) (Auto) 9.0, Eosinophils (%) (Auto) 0.4

, Basophils (%) (Auto) 0.1, Neutrophils # (Auto) 12.78, Lymphocytes # (Auto) 

1.05, Monocytes # (Auto) 1.38, Eosinophils # (Auto) 0.06, Basophils # (Auto) 

0.02





3/25/17 21:16

















Test


  3/25/17


21:16


 


White Blood Count


  15.35 K/uL


(4.8-10.8)


 


Red Blood Count


  4.97 M/uL


(4.7-6.1)


 


Hemoglobin


  12.4 g/dL


(14.0-18.0)


 


Hematocrit 34.7 % (42-52) 


 


Mean Corpuscular Volume


  69.8 fL


()


 


Mean Corpuscular Hemoglobin


  24.9 pg


(25-34)


 


Mean Corpuscular Hemoglobin


Concent 35.7 g/dl


(32-36)


 


Platelet Count


  261 K/uL


(130-400)


 


Neutrophils (%) (Auto) 83.3 % 


 


Lymphocytes (%) (Auto) 6.8 % 


 


Monocytes (%) (Auto) 9.0 % 


 


Eosinophils (%) (Auto) 0.4 % 


 


Basophils (%) (Auto) 0.1 % 


 


Neutrophils # (Auto)


  12.78 K/uL


(1.4-6.5)


 


Lymphocytes # (Auto)


  1.05 K/uL


(1.2-3.4)


 


Monocytes # (Auto)


  1.38 K/uL


(0.11-0.59)


 


Eosinophils # (Auto)


  0.06 K/uL


(0-0.5)


 


Basophils # (Auto)


  0.02 K/uL


(0-0.2)


 


RDW Standard Deviation


  40.2 fL


(36.4-46.3)


 


RDW Coefficient of Variation


  16.0 %


(11.5-14.5)


 


Immature Granulocyte % (Auto) 0.4 % 


 


Immature Granulocyte # (Auto)


  0.06 K/uL


(0.00-0.02)


 


Polychromasia 1+ 


 


Poikilocytosis PRESENT 


 


Anisocytosis PRESENT 


 


Microcytosis PRESENT 


 


Macrocytosis PRESENT 


 


Ovalocytes 1+ 


 


Schistocytes OCCASIONAL 


 


Anion Gap


  10.0 mmol/L


(3-11)


 


Est Creatinine Clear Calc


Drug Dose 103.2 ml/min 


 


 


Estimated GFR (


American) > 150.0 


 


 


Estimated GFR (Non-


American 130.2 


 


 


BUN/Creatinine Ratio 10.0 (10-20) 


 


Calcium Level


  8.8 mg/dl


(8.5-10.1)


 


Total Bilirubin


  14.7 mg/dl


(0.2-1)


 


Direct Bilirubin


  9.4 mg/dl


(0-0.2)


 


Aspartate Amino Transf


(AST/SGOT) 82 U/L (15-37) 


 


 


Alanine Aminotransferase


(ALT/SGPT) 159 U/L


(12-78)


 


Alkaline Phosphatase


  95 U/L


()


 


Total Protein


  8.0 gm/dl


(6.4-8.2)


 


Albumin


  4.5 gm/dl


(3.4-5.0)


 


Lipase


  35200 U/L


()





Laboratory results reviewed by me





Medications Administered











 Medications


  (Trade)  Dose


 Ordered  Sig/Nicolas


 Route  Start Time


 Stop Time Status Last Admin


Dose Admin


 


 Sodium Chloride


  (Nss 1000ml)  2,000 ml @ 


 999 mls/hr  Q2H1M STAT


 IV  3/25/17 21:17


 3/25/17 23:17 DC 3/25/17 21:23


999 MLS/HR


 


 Fentanyl Citrate


  (Fentanyl Inj)  50 mcg  Q15M  PRN


 IV  3/25/17 22:00


 4/8/17 21:59  3/26/17 01:08


50 MCG


 


 Ondansetron HCl


  (Zofran Inj)  4 mg  NOW  STAT


 IV  3/25/17 21:46


 3/25/17 21:48 DC 3/25/17 21:53


4 MG


 


 Piperacillin Sod/


 Tazobactam Sod


 4.5 gm  4.5 gm  NOW  STAT


 IV  3/25/17 22:45


 3/25/17 22:47 DC 3/25/17 22:54


4.5 GM


 


 Lactated Ringer's


  (Lr 1000ml)  1,000 ml @ 


 200 mls/hr  Q5H STAT


 IV  3/25/17 23:01


 3/26/17 04:00  3/25/17 23:21


200 MLS/HR











ED Course


2117: The patient was evaluated in room B3. A complete history and physical 

exam was performed. Ordered Sodium Chloride 2000 ml @ 999 mls/hr IV





2146: Ordered Zofran 4 mg IV





2200: Ordered Fentanyl 50 mcg IV





2245: Zosyn Iv 4.5 gm IV





2248: I reassessed the patient at this time. He is doing better. 





2251: At this time, I spoke with Dr. Patrick - General Surgery. We discussed 

the patient's case. He stated that I should speak with GI for a possible ERCP 

procedure. 





2301: Lactated Ringer's 1000 ml @ mls/hr IV





2304: I spoke with Dr. Maria - GI, at this time. They recommended that the 

patient should be placed on NPO. We should watch his symptoms while giving him 

fluid and antibiotics and the symptoms should pass on their own. We will 

monitor him overnight. 





2357: Upon reexamination, the patient was resting. I discussed the test results 

and treatment plan with him. The patient will be evaluated by Dr. Velarde 

- [facility] [place], for further management.





Medical Decision


Triage Nursing notes reviewed.


The patient's presentation and history were concerning for abdominal pain.  





Etiologies such as sickle crisis, organ infarction, biliary pathology, 

pancreatitis, appendicitis, diverticulitis,  obstruction, inflammatory bowel 

disease, renal colic, PUD,mesenteric ischemia, aortic pathology, infections, 

genitourinary, UTI, perforated viscus, as well as others were entertained.   





The patient was evaluated.  He was quite uncomfortable.  He was hydrated with 2 

L normal saline.  He was given IV Zofran and IV fentanyl.  He received multiple 

doses of IV fentanyl.  The patient was jaundice.  He had scleral icterus.  The 

patient was mildly tachycardic.  He was sent for ultrasound.  Blood work 

revealed a leukocytosis and marked elevation of his LFTs.  Ultrasound imaging 

was concerning for cholecystitis.  The patient was given IV Zosyn.  

Consultation was made with general surgery, Dr. Patrick who recommended urgent 

GI consultation.  Consultation was made with Dr. maria.  He recommended 

lactated Ringer's, pain medication, and agreed with the antibiotic dosing.  He 

asked that the patient admitted by internal medicine.  If the patient is 

worsening he would like to be notified as he may need urgent ERCP.  

Consultation was made with Dr. Enoch Velarde.  He evaluated the patient in 

the Emergency Room.  He did order an MRCP as well as additional treatment.  The 

patient was admitted for further management.








The chart was completed utilizing Dragon Speech voice recognition software.   

Grammatical errors, random word insertions, pronoun errors, and incomplete 

sentences are an occasional consequence of this system due to software 

limitations, ambient noise, and hardware issues.  Any formal questions or 

concerns about the content, text, or information contained within the body of 

this dictation should be directly addressed to the physician for clarification.





Consults


Time Called:  2248


Consulting Physician:  Dr. Patrick - General Surgery


Returned Call:  2251


We discussed the patient's case. See ED course.


Additional Consults:  


   Time Called:  2300


   Consulted Physician:  Dr. Candace DONNELLY


   Returned Call:  [phone]


Additional Comments:


We discussed the patient's case. See ED course.


   Time Called:  2350


   Consulted Physician:  Dr. Velarde - [facility] [place]


   Returned Call:  [phone]


Additional Comments:


He will be evaluating the patient for further management.





Impression





 Primary Impression:  


 Cholecystitis


 Additional Impression:  


 Pancreatitis





Scribe Attestation


The scribe's documentation has been prepared under my direction and personally 

reviewed by me in its entirety. I confirm that the note above accurately 

reflects all work, treatment, procedures, and medical decision making performed 

by me.





Departure Information


Dispostion


Being Evaluated By Hospitalist





[facility] (PCP)





Patient Instructions


My [facility]





Problem Qualifiers








 Additional Impression:  


 Pancreatitis


 Chronicity:  acute  Pancreatitis type:  biliary  Acute pancreatitis 

complication:  unspecified  Qualified Codes:  K85.10 - Biliary acute 

pancreatitis without necrosis or infection

## 2017-03-26 NOTE — PROGRESS NOTE
Progress Note


Date of Service


Mar 26, 2017.





Progress Note


Patient admitted with gallstone pancreatitis. With bili improving today suspect 

passed stone. Patient asked that Dr. Diaz follow to make sure sickle cell 

controlled - I will consult. Patient reports that Morphine tends to make him 

more nauseous.  He has used Dilaudid in the past in PCA form, I will change 

morphine to Dilaudid but for prn dosing. GI directing management which is much 

appreciated. 


Patient is concerned about his schooling. 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. He reports that it is competitive and he will be missing school and has 

missed. I will ask social service if (with permission) we could make call on 

patients behalf to his advisor essentially to confirm that severity of the 

patients illness.

## 2017-03-26 NOTE — GASTROINTESTINAL CONSULTATION
Gastrointestinal Consultation


Date of Consultation:  Mar 26, 2017


History of Present Illness


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 

who presented with a worsening of several days of generalized to epigastric 

abdominal pain.


He was admitted and d/c'd two days prior for similar but less significant 

abdominal pain. The pain he is experiencing now is the most severe, located in 

the epigastrium, and radiates across his abdomen. Does have nausea without 

vomiting. No diarrhea, no recent or historical EtoH use. 





On presentation here he was noted to have dramatically elevated Bili, Lipase, 

and WBC count all likely due to gallstone pancreatitis. Tachycardic without 

hypotension but in significant pain. His gallbladder on US is thickened and 

inflamed. CBD was measured to 9 mm, however, MRCP did not show biliary ductal 

dilation and no definitive choledocholithiasis. WBC count, bilirubin have 

declined today. VS are improved and pain is being controlled. No hx of this in 

past. He does have sickle cell disease, prior admission was thought to be a 

sickle crisis, but was not transfused and was discharge with a bilirubin of 

3.3. 


AST/ALT are elevated but AP is normalized.





Past Medical/Surgical History


Medical Problems:


(1) Central abdominal pain


Status: Acute  





(2) Cholecystitis


Status: Acute  





(3) LUQ abdominal pain


Status: Acute  





(4) Pancreatitis


Status: Acute  





(5) Sickle cell crisis


Status: Acute  





(6) Sickle cell disease


Status: Acute  





(7) Splenic infarct


Status: Acute  





(8) Splenic infarct


Status: Acute  











Family History





Diabetes mellitus


FH: heart disease


Hypertension


Kidney disease





Social History


Smoking Status:  Light Tobacco Smoker


Alcohol Use:  none


Marital Status:  single


Occupation Status:  [employer] student





Allergies


Coded Allergies:  


     No Known Allergies (Unverified , 1/8/17)





Current Medications





Home Meds and Scripts








 Medications  Dose


 Route/Sig


 Max Daily Dose Days Date Category


 


 Zofran


  (Ondansetron HCl)


 4 Mg Tab  4 Mg


 PO Q6H PRN


    3/25/17 Reported


 


 Percocet


 10MG/325MG


  (Oxycodone/Acetaminophen)


 Tab  1 Tab


 PO AS DIRECTED PRN


   10 3/23/17 Rx


 


 Hydrea Cap


  (Hydroxyurea) 500


 Mg Cap  500 Mg


 PO DAILY


   30 3/23/17 Rx











Review of Systems


Constitutional:  + see HPI


Eyes:  No diplopia, No discharge, No eye pain, No problem reported, No redness, 

No see HPI, No worsening of vision


Respiratory:  No cough, No dyspnea at rest, No dyspnea on exertion, No 

hemoptysis, No problem reported, No see HPI, No shortness of breath, No sputum, 

No wheezing


Cardiac:  No PND, No chest pain, No claudication, No edema, No orthopnea, No 

palpitations, No problem reported, No see HPI


Abdomen:  + see HPI


Musculoskeletal:  No calf pain, No joint pain, No muscle pain, No problem 

reported, No see HPI, No swelling





Physical Exam











  Date Time  Temp Pulse Resp B/P Pulse Ox O2 Delivery O2 Flow Rate FiO2


 


3/26/17 08:00      Room Air  


 


3/26/17 06:49 37.3 92 19 123/64 95 Room Air  


 


3/26/17 03:00 37.0 102 16 125/91 100 Room Air  


 


3/26/17 02:50 37.0 102 16 125/91  Room Air  


 


3/26/17 02:50      Room Air  


 


3/26/17 01:56 37.1 118 16 117/68 95   


 


3/26/17 01:34  118 16  95   


 


3/26/17 01:30    117/68    


 


3/26/17 01:04  102 19  99   


 


3/26/17 01:00    120/74    


 


3/26/17 00:34  72 17  97   


 


3/26/17 00:30    109/67    


 


3/26/17 00:24  72 20 109/61 96 Room Air  


 


3/26/17 00:04  73 22  99   


 


3/26/17 00:00    109/61    


 


3/25/17 23:37  81 20 110/68 97 Room Air  


 


3/25/17 23:34  86 20  98   


 


3/25/17 23:30    110/68    


 


3/25/17 23:04  93 20  97   


 


3/25/17 23:00    117/66    


 


3/25/17 22:55  93 20 120/81 96 Room Air  


 


3/25/17 22:51    120/81    


 


3/25/17 22:04  72 18  100   


 


3/25/17 22:00    131/90    


 


3/25/17 21:57  77 20 127/87 97 Nasal Cannula 2.0 


 


3/25/17 21:56     97 Nasal Cannula 2.0 


 


3/25/17 21:56     86 Room Air  


 


3/25/17 21:34  94 23  100   


 


3/25/17 21:30    127/87    


 


3/25/17 21:29  87 20 127/87 100 Room Air  


 


3/25/17 21:29  100      


 


3/25/17 21:06 37.1 108 20 121/72 98 Room Air  








General Appearance:  + pertinent finding (ill appearing, falling asleep during 

interview)


Eyes:  + pertinent finding (yellow eyes)


Respiratory/Chest:  chest non-tender, lungs clear, normal breath sounds


Cardiovascular:  regular rate, rhythm, no edema


Abdomen:  + pertinent finding (hypoactive, voluntary guarding, no rebound. )


Extremities:  normal range of motion


Neurologic/Psych:  CNs II-XII nml as tested


MRCP


FINDINGS: The splenic infarct is similar appearance to CT of March 23, 2017. No


biliary ductal dilatation is identified. No common bile duct calculi are


identified although sensitivity is diminished due to motion artifact. There has


been interval development of a small to moderate amount of fluid within the


abdomen and pelvis since prior abdominal CT. Peripancreatic fluid is noted.


Sludge and stones are noted within the gallbladder. There is mild gallbladder


wall thickening. Trace bilateral pleural effusion are noted. Unenhanced images


of the liver, adrenal glands and kidneys are normal.





IMPRESSION:  





1.  Interval development of abdominal and pelvic ascites with peripancreatic


infiltration. The findings favor acute pancreatitis. Discussed with Dr. Crawford


at time of dictation.





2. No biliary ductal dilatation. No common bile duct calculi identified although


sensitivity diminished due to motion artifact.





3. Cholelithiasis and mild gallbladder wall thickening which may reflect acute


cholecystitis.





4. No change in appearance of the splenic infarct.





RUQ US


Liver: The liver is normal in size and echotexture. There is no intrahepatic


biliary ductal dilatation. The main portal vein is patent.





Gallbladder: The gallbladder is distended and filled with sludge and stones. The


gallbladder wall is thickened and hyperemic measuring up to 4 mm. There is a


small volume of free fluid in the right upper quadrant. A sonographic Butts's


sign is reportedly present. The common bile duct measures up to 0.9 cm in


diameter.





Pancreas: Not well visualized due to overlying bowel gas.





Right kidney: Survey images of the right kidney demonstrate normal size and


echotexture. There is no hydronephrosis.





Ascites: A small volume of free fluid is seen in the right upper quadrant.








IMPRESSION: Cholelithiasis and biliary sludge with sonographic evidence of acute


cholecystitis. Surgical consultation is advised.





Laboratory Results





Last 24 Hours








Test


  3/25/17


21:16 3/26/17


10:09


 


White Blood Count 15.35 K/uL  14.04 K/uL 


 


Red Blood Count 4.97 M/uL  4.61 M/uL 


 


Hemoglobin 12.4 g/dL  11.7 g/dL 


 


Hematocrit 34.7 %  31.8 % 


 


Mean Corpuscular Volume 69.8 fL  69.0 fL 


 


Mean Corpuscular Hemoglobin 24.9 pg  25.4 pg 


 


Mean Corpuscular Hemoglobin


Concent 35.7 g/dl 


  36.8 g/dl 


 


 


Platelet Count 261 K/uL  222 K/uL 


 


Neutrophils (%) (Auto) 83.3 %  81.9 % 


 


Lymphocytes (%) (Auto) 6.8 %  8.3 % 


 


Monocytes (%) (Auto) 9.0 %  9.0 % 


 


Eosinophils (%) (Auto) 0.4 %  0.3 % 


 


Basophils (%) (Auto) 0.1 %  0.1 % 


 


Neutrophils # (Auto) 12.78 K/uL  11.51 K/uL 


 


Lymphocytes # (Auto) 1.05 K/uL  1.17 K/uL 


 


Monocytes # (Auto) 1.38 K/uL  1.26 K/uL 


 


Eosinophils # (Auto) 0.06 K/uL  0.04 K/uL 


 


Basophils # (Auto) 0.02 K/uL  0.01 K/uL 


 


RDW Standard Deviation 40.2 fL  39.0 fL 


 


RDW Coefficient of Variation 16.0 %  15.8 % 


 


Immature Granulocyte % (Auto) 0.4 %  0.4 % 


 


Immature Granulocyte # (Auto) 0.06 K/uL  0.05 K/uL 


 


Polychromasia 1+  1+ 


 


Poikilocytosis PRESENT  


 


Anisocytosis PRESENT  


 


Microcytosis PRESENT  PRESENT 


 


Macrocytosis PRESENT  


 


Ovalocytes 1+  


 


Schistocytes OCCASIONAL  


 


Sodium Level 135 mmol/L  137 mmol/L 


 


Potassium Level 3.5 mmol/L  3.5 mmol/L 


 


Chloride Level 97 mmol/L  100 mmol/L 


 


Carbon Dioxide Level 28 mmol/L  27 mmol/L 


 


Anion Gap 10.0 mmol/L  10.0 mmol/L 


 


Blood Urea Nitrogen 8 mg/dl  5 mg/dl 


 


Creatinine 0.79 mg/dl  0.66 mg/dl 


 


Est Creatinine Clear Calc


Drug Dose 103.2 ml/min 


  123.5 ml/min 


 


 


Estimated GFR (


American) > 150.0 


  > 150.0 


 


 


Estimated GFR (Non-


American 130.2 


  140.2 


 


 


BUN/Creatinine Ratio 10.0  7.0 


 


Random Glucose 98 mg/dl  79 mg/dl 


 


Calcium Level 8.8 mg/dl  8.2 mg/dl 


 


Total Bilirubin 14.7 mg/dl  9.9 mg/dl 


 


Direct Bilirubin 9.4 mg/dl  5.1 mg/dl 


 


Aspartate Amino Transf


(AST/SGOT) 82 U/L 


  51 U/L 


 


 


Alanine Aminotransferase


(ALT/SGPT) 159 U/L 


  110 U/L 


 


 


Alkaline Phosphatase 95 U/L  85 U/L 


 


Total Protein 8.0 gm/dl  7.0 gm/dl 


 


Albumin 4.5 gm/dl  3.7 gm/dl 


 


Lipase 98714 U/L  


 


Target Cells  1+ 


 


Globulin  3.3 gm/dl 


 


Albumin/Globulin Ratio  1.1 











Impression


Patient is a 21 year old male with sickle cell disease with moderately severe 

gallstone pancreatitis.





Plan


Does have a WBC count and elevated bilirubin, but normal alkaline phosphatase. 

At least some of his bilirubin can be explained from sickle cell and 

cholecystitis. With his severity of pancreatitis, normal AP, lack of fever, no 

obstruction from MRCP, and no signs of hypotension, I discussed case with one 

of our ERCP MD's who suggested continued observation. If has fever, hypotension

, then acute biliary decompression can be pursued, however, may worsen 

pancreatitis and should be proceeded with caution. He does not have Charot's 

Triad or Reynald's Pentad.


BISOP score is 2, meaning less than 2% chance of mortality. The offending stone 

may have passed given labs and lack of biliary ductal dilation. 





Recs


1. IVF to continue with LR at 250 cc/hr. If has hypoxia can be turned down. 

Would continue for at least another 24 hrs.


2. Pain Control


3. Wean Abx to Zosyn only


4. Blood Cx's x 2


5. Follow daily labs


6. If any clinical signs of decompensation, such as hypotension, fevers, or 

chills, please call GI. If occurs consider repeat CT to exclude necrosis, 

pancreatic fluid collections. 


7. Call with any questions


8. At the minimum prior to D/C should have cholecystectomy eval and 

intraoperative IOC.

## 2017-03-26 NOTE — ONCOLOGY CONSULTATION
Oncology/Heme Consultation


Date of Consultation:


Mar 26, 2017.


Attending Physician:


Enoch Velarde M.D.


Reason for Consultation:


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


History of Present Illness


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 

hemoglobin. As his chart reflects he was just hospitalized a few days ago with 

abdominal pain without nausea. At that time his chemistries were such that his 

bilirubin although 3.3 was in the range of his usual bilirubin. Amylase and 

lipase were both normal. His abdominal pain subsided after hydration and he was 

discharged. He now returns with continued pain primarily in the right upper 

quadrant and back. Now his bilirubin is quite high primarily direct bilirubin. 

SGOT and SGPT are elevated but alkaline phosphatase is normal. CBC is 

acceptable. Lipase and MRCP findings are all consistent with pancreatitis. I do 

suspect that he has had a gallstone pancreatitis.





A few months ago he had presented with infarction of his spleen that actually 

on the last CT scan done very recently there was demonstration of decreased 

splenic infarct with what appeared to be new or reformed splenic parenchyma.





This very at that hemoglobin S tends to have a rather benign or less critical 

clinical issues because of the persistent fetal hemoglobin. However in spite of 

adequate of course infarcts like splenic infarcts have been recorded and they 

do have evidence of hemolysis and subsequent problems like: 58. His brother who 

I believe most likely has a same issue is status post cholecystectomy. Mr. Sandy Mcdonnell can only recall one other incidence of what sounds like a sickle crisis 

when he was very young.  At that time he had a bony event and again his 

hemoglobinopathy hasn't been an issue until the recent splenic infarct.





He denies any fever or chills. At our last consultation just a few days ago he 

inquired about taking hydroxyurea. I warned him at that time about beginning 

hydroxyurea because I wasn't sure where the abdominal pain was going to go as 

far as whether it was going to go away or develop into something else such as 

where he is now with possible cholecystitis.  Nevertheless he wanted to begin a 

small dose and a prescription was given to him for 500 mg of hydroxyurea day 

but he has not taken any at this point.





Past Medical/Surgical History


Medical Problems:


(1) Central abdominal pain


Status: Acute  





(2) Cholecystitis


Status: Acute  





(3) LUQ abdominal pain


Status: Acute  





(4) Pancreatitis


Status: Acute  





(5) Sickle cell crisis


Status: Acute  





(6) Sickle cell disease


Status: Acute  





(7) Splenic infarct


Status: Acute  





(8) Splenic infarct


Status: Acute  








Family History





Diabetes mellitus


FH: heart disease


Hypertension


Kidney disease





Social History


Smoking Status:  Light Tobacco Smoker


Smokeless Tobacco Use:  No


Alcohol Use:  none


Marital Status:  single


Occupation Status:  [employer] student





Allergies


Coded Allergies:  


     No Known Allergies (Unverified , 1/8/17)





Home Medications


Scheduled


Hydroxyurea (Hydrea Cap), 500 MG PO DAILY





Scheduled PRN


Ondansetron Hcl (Zofran), 4 MG PO Q6H PRN for Nausea


Oxycodone/Acetaminophen 10MG/325MG (Percocet 10MG/325MG), 1 TAB PO AS DIRECTED 

PRN for Pain





Current Inpatient Medications





 Current Inpatient Medications








 Medications


  (Trade)  Dose


 Ordered  Sig/Nioclas


 Route  Start Time


 Stop Time Status Last Admin


Dose Admin


 


 Piperacillin Sod/


 Tazobactam Sod/


 Dextrose


  (Zosyn Iv/D5


 100ml)  115 ml @ 


 28 mls/hr  Q8H


 IV  3/26/17 04:00


 4/5/17 03:59  3/26/17 11:39


28 MLS/HR


 


 Ondansetron HCl 4


 mg  4 mg  Q6H  PRN


 IV  3/26/17 01:15


 4/25/17 01:14   


 


 


 Pantoprazole


 Sodium 40 mg/


 Syringe  10 ml @ 5


 mls/min  DAILY@11


 IV  3/26/17 11:00


 4/25/17 10:59  3/26/17 10:41


5 MLS/MIN


 


 Promethazine HCl/


 Sodium Chloride


  (Phenergan Inj/


 Nss 50ml)  51 ml @ 


 204 mls/hr  Q6H  PRN


 IV  3/26/17 01:15


 4/25/17 01:14   


 


 


 Diphenhydramine


 HCl


  (Benadryl Inj)  50 mg  Q6H  PRN


 IV  3/26/17 01:30


 4/25/17 01:29  3/26/17 05:41


50 MG


 


 Piperacillin Sod/


 Tazobactam Sod 1


 ea  1 ea  UD  PRN


 N/A  3/26/17 09:30


 4/25/17 09:29   


 


 


 Lactated Ringer's


  (Lr 1000ml)  1,000 ml @ 


 250 mls/hr  Q4H


 IV  3/26/17 10:00


 4/25/17 09:59  3/26/17 18:33


250 MLS/HR


 


 Hydromorphone HCl


  (Dilaudid Inj)  0.5 mg  Q2HWA  PRN


 IV  3/26/17 18:00


 4/9/17 17:59  3/26/17 17:17


0.5 MG


 


 Hydromorphone HCl


  (Dilaudid Inj)  1 mg  Q2HWA  PRN


 IV  3/26/17 18:00


 4/9/17 17:59   


 











Review of Systems


Constitutional:  Negative for weight loss, night sweats, or fever


Eyes:  Negative for event change of vision


ENT:  Negative for epistaxis, nasal discharge, sore throat, or deafness


Cardiovascular:  Negative for chest pain, palpitations, dizziness, diaphoresis


Respiratory:  Negative for new shortness of breath,hemoptysis, or purulent cough


Gastrointestinal:  Negative for diarrhea, hematemesis, melena, plus nausea or 

vomiting


Integumentary (skin):  Negative for rash 


Genitourinary:  Negative for urinary frequency, hematuria, or dysuria


Neurological:  Negative for weakness, seizure activity, headache, or dizziness


Lymphatic/Hematologic:  Negative for petechiae, bleeding or new adenopathy


Musculoskeletal:  Negative for new joint or back pain


Allergic/Immunologic:  Negative for unusual rash or pruritis.





Physical Exam











  Date Time  Temp Pulse Resp B/P Pulse Ox O2 Delivery O2 Flow Rate FiO2


 


3/26/17 15:22 37.2 90 16 116/70 96 Room Air  


 


3/26/17 08:00      Room Air  


 


3/26/17 06:49 37.3 92 19 123/64 95 Room Air  


 


3/26/17 03:00 37.0 102 16 125/91 100 Room Air  


 


3/26/17 02:50 37.0 102 16 125/91  Room Air  


 


3/26/17 02:50      Room Air  


 


3/26/17 01:56 37.1 118 16 117/68 95   


 


3/26/17 01:34  118 16  95   


 


3/26/17 01:30    117/68    


 


3/26/17 01:04  102 19  99   


 


3/26/17 01:00    120/74    


 


3/26/17 00:34  72 17  97   


 


3/26/17 00:30    109/67    


 


3/26/17 00:24  72 20 109/61 96 Room Air  


 


3/26/17 00:04  73 22  99   


 


3/26/17 00:00    109/61    


 


3/25/17 23:37  81 20 110/68 97 Room Air  


 


3/25/17 23:34  86 20  98   


 


3/25/17 23:30    110/68    


 


3/25/17 23:04  93 20  97   


 


3/25/17 23:00    117/66    


 


3/25/17 22:55  93 20 120/81 96 Room Air  


 


3/25/17 22:51    120/81    


 


3/25/17 22:04  72 18  100   


 


3/25/17 22:00    131/90    


 


3/25/17 21:57  77 20 127/87 97 Nasal Cannula 2.0 


 


3/25/17 21:56     97 Nasal Cannula 2.0 


 


3/25/17 21:56     86 Room Air  


 


3/25/17 21:34  94 23  100   


 


3/25/17 21:30    127/87    


 


3/25/17 21:29  87 20 127/87 100 Room Air  


 


3/25/17 21:29  100      


 


3/25/17 21:06 37.1 108 20 121/72 98 Room Air  








Constitutional: vitals are stable. Thin pleasant young man


Eyes: Eyes are BRENT EOMI without conjuctival erythema positive for scleral 

icterus


ENT: External examination was negative for masses.


Neck: Negative for masses or palpable thyromegaly


Respiratory:  Lung sounds were generally clear bilaterally


Cardiovascular: Heart was RRR without significant murmur, gallops aoe rubs


Gastrointestinal: No palpable hepatic or splenomegaly. The abdomen was mildly 

tender in the right upper quadrant or particular bowel sounds are decreased


Lymphatic system:  there was no palpable peripheral lymphadenopathy


Musculoskeletal System:  The musculoskeletal system seemed concordant with age.


Skin: The skin was negative for unusual rash


Neurologic exam: The exam was negative for any focal findings.  Deep tendon 

reflexes were equal and symmetrical.


Psychiatric exam: Was essentially negative with normal mood and effect.


Extremities: Negative for edema erythema





Laboratory Results





Last 24 Hours








Test


  3/25/17


21:16 3/26/17


10:09


 


White Blood Count 15.35 K/uL  14.04 K/uL 


 


Red Blood Count 4.97 M/uL  4.61 M/uL 


 


Hemoglobin 12.4 g/dL  11.7 g/dL 


 


Hematocrit 34.7 %  31.8 % 


 


Mean Corpuscular Volume 69.8 fL  69.0 fL 


 


Mean Corpuscular Hemoglobin 24.9 pg  25.4 pg 


 


Mean Corpuscular Hemoglobin


Concent 35.7 g/dl 


  36.8 g/dl 


 


 


Platelet Count 261 K/uL  222 K/uL 


 


Neutrophils (%) (Auto) 83.3 %  81.9 % 


 


Lymphocytes (%) (Auto) 6.8 %  8.3 % 


 


Monocytes (%) (Auto) 9.0 %  9.0 % 


 


Eosinophils (%) (Auto) 0.4 %  0.3 % 


 


Basophils (%) (Auto) 0.1 %  0.1 % 


 


Neutrophils # (Auto) 12.78 K/uL  11.51 K/uL 


 


Lymphocytes # (Auto) 1.05 K/uL  1.17 K/uL 


 


Monocytes # (Auto) 1.38 K/uL  1.26 K/uL 


 


Eosinophils # (Auto) 0.06 K/uL  0.04 K/uL 


 


Basophils # (Auto) 0.02 K/uL  0.01 K/uL 


 


RDW Standard Deviation 40.2 fL  39.0 fL 


 


RDW Coefficient of Variation 16.0 %  15.8 % 


 


Immature Granulocyte % (Auto) 0.4 %  0.4 % 


 


Immature Granulocyte # (Auto) 0.06 K/uL  0.05 K/uL 


 


Polychromasia 1+  1+ 


 


Poikilocytosis PRESENT  


 


Anisocytosis PRESENT  


 


Microcytosis PRESENT  PRESENT 


 


Macrocytosis PRESENT  


 


Ovalocytes 1+  


 


Schistocytes OCCASIONAL  


 


Sodium Level 135 mmol/L  137 mmol/L 


 


Potassium Level 3.5 mmol/L  3.5 mmol/L 


 


Chloride Level 97 mmol/L  100 mmol/L 


 


Carbon Dioxide Level 28 mmol/L  27 mmol/L 


 


Anion Gap 10.0 mmol/L  10.0 mmol/L 


 


Blood Urea Nitrogen 8 mg/dl  5 mg/dl 


 


Creatinine 0.79 mg/dl  0.66 mg/dl 


 


Est Creatinine Clear Calc


Drug Dose 103.2 ml/min 


  123.5 ml/min 


 


 


Estimated GFR (


American) > 150.0 


  > 150.0 


 


 


Estimated GFR (Non-


American 130.2 


  140.2 


 


 


BUN/Creatinine Ratio 10.0  7.0 


 


Random Glucose 98 mg/dl  79 mg/dl 


 


Calcium Level 8.8 mg/dl  8.2 mg/dl 


 


Total Bilirubin 14.7 mg/dl  9.9 mg/dl 


 


Direct Bilirubin 9.4 mg/dl  5.1 mg/dl 


 


Aspartate Amino Transf


(AST/SGOT) 82 U/L 


  51 U/L 


 


 


Alanine Aminotransferase


(ALT/SGPT) 159 U/L 


  110 U/L 


 


 


Alkaline Phosphatase 95 U/L  85 U/L 


 


Total Protein 8.0 gm/dl  7.0 gm/dl 


 


Albumin 4.5 gm/dl  3.7 gm/dl 


 


Lipase 81595 U/L  


 


Target Cells  1+ 


 


Globulin  3.3 gm/dl 


 


Albumin/Globulin Ratio  1.1 











Assessment & Plan


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 

hemoglobin. With his chemistries and findings I suspect that he has had a 

gallstone and now resultant pancreatitis. CBC is acceptable. Would continue 

hydration and I suspect surgery will need to be consulted. A cholecystectomy 

will eventually need to be done once the a pancreatitis subsides.





I have told Mr. Martinez not to take any hydroxyurea until after this - (the 

time of surgery) is established and done

## 2017-03-27 VITALS
SYSTOLIC BLOOD PRESSURE: 132 MMHG | TEMPERATURE: 98.6 F | OXYGEN SATURATION: 100 % | DIASTOLIC BLOOD PRESSURE: 86 MMHG | HEART RATE: 127 BPM

## 2017-03-27 VITALS
SYSTOLIC BLOOD PRESSURE: 132 MMHG | OXYGEN SATURATION: 99 % | HEART RATE: 92 BPM | TEMPERATURE: 98.24 F | DIASTOLIC BLOOD PRESSURE: 70 MMHG

## 2017-03-27 VITALS
DIASTOLIC BLOOD PRESSURE: 68 MMHG | OXYGEN SATURATION: 96 % | SYSTOLIC BLOOD PRESSURE: 126 MMHG | TEMPERATURE: 98.96 F | HEART RATE: 93 BPM

## 2017-03-27 VITALS — HEART RATE: 94 BPM

## 2017-03-27 LAB
ALP SERPL-CCNC: 67 U/L (ref 45–117)
ALT SERPL-CCNC: 70 U/L (ref 12–78)
AMYLASE SERPL-CCNC: 215 U/L (ref 25–115)
ANION GAP SERPL CALC-SCNC: 10 MMOL/L (ref 3–11)
AST SERPL-CCNC: 27 U/L (ref 15–37)
BASOPHILS # BLD: 0.02 K/UL (ref 0–0.2)
BASOPHILS NFR BLD: 0.2 %
BUN SERPL-MCNC: 7 MG/DL (ref 7–18)
BUN/CREAT SERPL: 13.5 (ref 10–20)
CALCIUM SERPL-MCNC: 8.4 MG/DL (ref 8.5–10.1)
CHLORIDE SERPL-SCNC: 98 MMOL/L (ref 98–107)
CO2 SERPL-SCNC: 29 MMOL/L (ref 21–32)
COMPLETE: YES
CREAT CL PREDICTED SERPL C-G-VRATE: 159.8 ML/MIN
CREAT SERPL-MCNC: 0.51 MG/DL (ref 0.6–1.4)
EOSINOPHIL NFR BLD AUTO: 192 K/UL (ref 130–400)
GLUCOSE SERPL-MCNC: 68 MG/DL (ref 70–99)
HCT VFR BLD CALC: 27.8 % (ref 42–52)
IG%: 0.4 %
IMM GRANULOCYTES NFR BLD AUTO: 15.9 %
INR PPP: 1.2 (ref 0.9–1.1)
LYMPHOCYTES # BLD: 1.71 K/UL (ref 1.2–3.4)
MAGNESIUM SERPL-MCNC: 1.4 MG/DL (ref 1.8–2.4)
MCH RBC QN AUTO: 25.9 PG (ref 25–34)
MCHC RBC AUTO-ENTMCNC: 36.3 G/DL (ref 32–36)
MCV RBC AUTO: 71.3 FL (ref 80–100)
MONOCYTES NFR BLD: 13 %
NEUTROPHILS # BLD AUTO: 1 %
NEUTROPHILS NFR BLD AUTO: 69.5 %
PARTIAL THROMBOPLASTIN RATIO: 1.3
PMV BLD AUTO: 10.6 FL (ref 7.4–10.4)
POTASSIUM SERPL-SCNC: 3.4 MMOL/L (ref 3.5–5.1)
PROTHROMBIN TIME: 13.4 SECONDS (ref 9–12)
RBC # BLD AUTO: 3.9 M/UL (ref 4.7–6.1)
SODIUM SERPL-SCNC: 137 MMOL/L (ref 136–145)
WBC # BLD AUTO: 10.73 K/UL (ref 4.8–10.8)

## 2017-03-27 RX ADMIN — HYDROMORPHONE HYDROCHLORIDE PRN MG: 1 INJECTION, SOLUTION INTRAMUSCULAR; INTRAVENOUS; SUBCUTANEOUS at 05:15

## 2017-03-27 RX ADMIN — HYDROMORPHONE HYDROCHLORIDE PRN MG: 1 INJECTION, SOLUTION INTRAMUSCULAR; INTRAVENOUS; SUBCUTANEOUS at 21:06

## 2017-03-27 RX ADMIN — SODIUM CHLORIDE, SODIUM LACTATE, POTASSIUM CHLORIDE, AND CALCIUM CHLORIDE SCH MLS/HR: 600; 310; 30; 20 INJECTION, SOLUTION INTRAVENOUS at 22:15

## 2017-03-27 RX ADMIN — HYDROMORPHONE HYDROCHLORIDE PRN MG: 1 INJECTION, SOLUTION INTRAMUSCULAR; INTRAVENOUS; SUBCUTANEOUS at 17:06

## 2017-03-27 RX ADMIN — HYDROMORPHONE HYDROCHLORIDE PRN MG: 1 INJECTION, SOLUTION INTRAMUSCULAR; INTRAVENOUS; SUBCUTANEOUS at 01:22

## 2017-03-27 RX ADMIN — SODIUM CHLORIDE, SODIUM LACTATE, POTASSIUM CHLORIDE, AND CALCIUM CHLORIDE SCH MLS/HR: 600; 310; 30; 20 INJECTION, SOLUTION INTRAVENOUS at 18:16

## 2017-03-27 RX ADMIN — SODIUM CHLORIDE, SODIUM LACTATE, POTASSIUM CHLORIDE, AND CALCIUM CHLORIDE SCH MLS/HR: 600; 310; 30; 20 INJECTION, SOLUTION INTRAVENOUS at 14:00

## 2017-03-27 RX ADMIN — HYDROMORPHONE HYDROCHLORIDE PRN MG: 1 INJECTION, SOLUTION INTRAMUSCULAR; INTRAVENOUS; SUBCUTANEOUS at 19:21

## 2017-03-27 RX ADMIN — HYDROMORPHONE HYDROCHLORIDE PRN MG: 1 INJECTION, SOLUTION INTRAMUSCULAR; INTRAVENOUS; SUBCUTANEOUS at 23:13

## 2017-03-27 RX ADMIN — HYDROMORPHONE HYDROCHLORIDE PRN MG: 1 INJECTION, SOLUTION INTRAMUSCULAR; INTRAVENOUS; SUBCUTANEOUS at 08:05

## 2017-03-27 RX ADMIN — SODIUM CHLORIDE, SODIUM LACTATE, POTASSIUM CHLORIDE, AND CALCIUM CHLORIDE SCH MLS/HR: 600; 310; 30; 20 INJECTION, SOLUTION INTRAVENOUS at 05:14

## 2017-03-27 RX ADMIN — PANTOPRAZOLE SODIUM SCH MLS/MIN: 40 INJECTION, POWDER, FOR SOLUTION INTRAVENOUS at 10:13

## 2017-03-27 RX ADMIN — PIPERACILLIN AND TAZOBACTAM SCH MLS/HR: 3; .375 INJECTION, POWDER, LYOPHILIZED, FOR SOLUTION INTRAVENOUS; PARENTERAL at 13:23

## 2017-03-27 RX ADMIN — PIPERACILLIN AND TAZOBACTAM SCH MLS/HR: 3; .375 INJECTION, POWDER, LYOPHILIZED, FOR SOLUTION INTRAVENOUS; PARENTERAL at 03:24

## 2017-03-27 RX ADMIN — HYDROMORPHONE HYDROCHLORIDE PRN MG: 1 INJECTION, SOLUTION INTRAMUSCULAR; INTRAVENOUS; SUBCUTANEOUS at 12:06

## 2017-03-27 RX ADMIN — HYDROMORPHONE HYDROCHLORIDE PRN MG: 1 INJECTION, SOLUTION INTRAMUSCULAR; INTRAVENOUS; SUBCUTANEOUS at 10:04

## 2017-03-27 RX ADMIN — SODIUM CHLORIDE, SODIUM LACTATE, POTASSIUM CHLORIDE, AND CALCIUM CHLORIDE SCH MLS/HR: 600; 310; 30; 20 INJECTION, SOLUTION INTRAVENOUS at 01:22

## 2017-03-27 RX ADMIN — HYDROMORPHONE HYDROCHLORIDE PRN MG: 1 INJECTION, SOLUTION INTRAMUSCULAR; INTRAVENOUS; SUBCUTANEOUS at 03:18

## 2017-03-27 RX ADMIN — HYDROMORPHONE HYDROCHLORIDE PRN MG: 1 INJECTION, SOLUTION INTRAMUSCULAR; INTRAVENOUS; SUBCUTANEOUS at 14:54

## 2017-03-27 RX ADMIN — SODIUM CHLORIDE, SODIUM LACTATE, POTASSIUM CHLORIDE, AND CALCIUM CHLORIDE SCH MLS/HR: 600; 310; 30; 20 INJECTION, SOLUTION INTRAVENOUS at 10:13

## 2017-03-27 RX ADMIN — SODIUM CHLORIDE, SODIUM LACTATE, POTASSIUM CHLORIDE, AND CALCIUM CHLORIDE SCH MLS/HR: 600; 310; 30; 20 INJECTION, SOLUTION INTRAVENOUS at 15:45

## 2017-03-27 RX ADMIN — PIPERACILLIN AND TAZOBACTAM SCH MLS/HR: 3; .375 INJECTION, POWDER, LYOPHILIZED, FOR SOLUTION INTRAVENOUS; PARENTERAL at 12:06

## 2017-03-27 NOTE — HOSPITALIST PROGRESS NOTE
Hospitalist Progress Note


Date of Service


Mar 27, 2017.


 (Cher Rowley ., PA-C)





Subjective


Pt evaluation today including:  conversation w/ patient, physical exam, chart 

review, lab review, review of inpatient medication list


Pain:  4/10 bilateral flank pain


PO Intake:  NPO


Voiding:  no voiding problems


Patient reports blemished better.  He states he still has a 4/10 bilateral 

flank pain that is worse with deep breaths.  He also complains of weakness and 

fatigue.  The patient is being kept NPO.  He denies any nausea or vomiting.  

The patient complains of itchiness, likely secondary to elevated bilirubin.  

The patient denies fevers, chills, sweats, chest pain, palpitations, 

claudication, cough, wheezing, shortness of breath, nausea, vomiting, dysuria, 

hematuria, urinary retention, paralysis, numbness and tingling.





   Additional Comments:


See HPI for pertinent positives and negatives.  All other systems reviewed and 

negative.


 (Cher Rowley, PA-C)





Objective


Vital Signs











  Date Time  Temp Pulse Resp B/P Pulse Ox O2 Delivery O2 Flow Rate FiO2


 


3/27/17 08:00      Room Air  


 


3/27/17 07:30  94      


 


3/27/17 07:01 37.0 127 18 132/86 100 Room Air  


 


3/26/17 23:26      Room Air  


 


3/26/17 23:20 37.0 98 16 121/78 97 Room Air  


 


3/26/17 20:00      Room Air  


 


3/26/17 15:22 37.2 90 16 116/70 96 Room Air  








 (Cher Rowley PA-C)





Physical Exam


General Appearance:  WD/WN, no apparent distress, + thin


Eyes:  normal inspection, PERRL, EOMI


ENT:  hearing grossly normal, pharynx normal, + pertinent finding (icterus)


Neck:  supple, no JVD, trachea midline


Respiratory/Chest:  lungs clear, normal breath sounds, no respiratory distress


Cardiovascular:  no gallop, no murmur, + tachycardia (regular rhythm)


Abdomen:  normal bowel sounds, soft, + tenderness (mild diffuse tenderness)


Extremities:  non-tender, normal inspection, no pedal edema


Neurologic/Psychiatric:  alert, normal mood/affect, oriented x 3


Skin:  normal color, warm/dry, + jaundice


 (Cher Rowley, PA-C)





Laboratory Results





Last 24 Hours








Test


  3/27/17


07:37


 


White Blood Count 10.73 K/uL 


 


Red Blood Count 3.90 M/uL 


 


Hemoglobin 10.1 g/dL 


 


Hematocrit 27.8 % 


 


Mean Corpuscular Volume 71.3 fL 


 


Mean Corpuscular Hemoglobin 25.9 pg 


 


Mean Corpuscular Hemoglobin


Concent 36.3 g/dl 


 


 


Platelet Count 192 K/uL 


 


Mean Platelet Volume 10.6 fL 


 


Neutrophils (%) (Auto) 69.5 % 


 


Lymphocytes (%) (Auto) 15.9 % 


 


Monocytes (%) (Auto) 13.0 % 


 


Eosinophils (%) (Auto) 1.0 % 


 


Basophils (%) (Auto) 0.2 % 


 


Neutrophils # (Auto) 7.46 K/uL 


 


Lymphocytes # (Auto) 1.71 K/uL 


 


Monocytes # (Auto) 1.39 K/uL 


 


Eosinophils # (Auto) 0.11 K/uL 


 


Basophils # (Auto) 0.02 K/uL 


 


RDW Standard Deviation 41.7 fL 


 


RDW Coefficient of Variation 16.0 % 


 


Immature Granulocyte % (Auto) 0.4 % 


 


Immature Granulocyte # (Auto) 0.04 K/uL 


 


Prothrombin Time 13.4 SECONDS 


 


Prothromb Time International


Ratio 1.2 


 


 


Activated Partial


Thromboplast Time 34.3 SECONDS 


 


 


Partial Thromboplastin Ratio 1.3 


 


Sodium Level 137 mmol/L 


 


Potassium Level 3.4 mmol/L 


 


Chloride Level 98 mmol/L 


 


Carbon Dioxide Level 29 mmol/L 


 


Anion Gap 10.0 mmol/L 


 


Blood Urea Nitrogen 7 mg/dl 


 


Creatinine 0.51 mg/dl 


 


Est Creatinine Clear Calc


Drug Dose 159.8 ml/min 


 


 


Estimated GFR (


American) > 150.0 


 


 


Estimated GFR (Non-


American > 150.0 


 


 


BUN/Creatinine Ratio 13.5 


 


Random Glucose 68 mg/dl 


 


Calcium Level 8.4 mg/dl 


 


Magnesium Level 1.4 mg/dl 


 


Total Bilirubin 5.5 mg/dl 


 


Direct Bilirubin 2.0 mg/dl 


 


Aspartate Amino Transf


(AST/SGOT) 27 U/L 


 


 


Alanine Aminotransferase


(ALT/SGPT) 70 U/L 


 


 


Alkaline Phosphatase 67 U/L 


 


Total Protein 6.4 gm/dl 


 


Albumin 3.2 gm/dl 


 


Amylase Level 215 U/L 


 


Lipase 1253 U/L 








 (Cher Rowley ., PA-C)





Assessment and Plan


22 y/o male with a history of sickle cell anemia presents to the ED on 3/25 

with abdominal pain, nausea, and vomiting.





Acute gallstone pancreatitis/acute cholecystitis--improving.  Pt met sepsis 

criteria on admission with tachycardia, WBC >12K and source of infection


-Admit to MedSurg


-Lactated Ringer's at 250 cc/hr


-GI consulted, appreciate recs:  Start clear liquid diet.  If tolerated, 

patient may start low-fat diet on 3/28.  Continue IVF at 250 cc/hr until 3/28.


-Consult general surgery for cholecystectomy evaluation, appreciate recs:  

Cholecystectomy after pancreatitis improves/resolves.  Continue current 

treatment.  Repeat CBC, BMP, LFTs, lipase in the morning.


-Continue IV Zosyn


-Clear liquid diet as tolerated


-Continue pain control with IV Dilaudid.  


-Continue nausea control with IV Zofran and Phenergan


-Continue Protonix 40 mg IV qd


-LFTs much improved.  Total bilirubin 5.5 on 3/27, improved from 9.9.  AST and 

ALT within normal limits


-Lipase improved to 1253 on 3/27 from 12,504





Hypomagnesemia


-Magnesium 1.4 on 3/27


-Given magnesium sulfate 1 g IV 1


-Continue to monitor.  Will likely need a second dose


-Very mild hypokalemia on 3/27 at 3.4.  Continue to monitor.  If magnesium is 

WNL and potassium still low, will replete as needed





Sickle cell anemia--stable


-Hgb stable at 10.1 on 3/27


-Dr. Diaz consulted, appreciate recs:  hold hydroxyurea





DVT prophylaxis


-SCDs





Code Status


-Level I, FULL RESUSCITATION STATUS








This chart was completed in part utilizing Dragon Speech Voice Recognition 

software. Attempts were made to minimize the grammatical errors, random word 

insertions, pronoun errors and incomplete sentences. Any formal questions or 

concerns about the content, text or information contained within the body of 

this dictation should be directly addressed to the provider for clarification.


 (hCer Rowley ., STERLING)


I personally evaluated this patient and performed a physical exam.  I reviewed 

the orders.  I read this note performed by Cher Rowley PA-C and agree with 

its contents.





Patient is doing much better today. Pain has greatly improved. He decided to 

simply withdraw from this semester so as not to have negative effect on GPA. I 

told him that I will be signing off this evening and that I will update my 

colleague who will resume care. 


 (Bipin Crawford, DO)

## 2017-03-27 NOTE — SURGERY CONSULTATION
Consultation


Date of Consultation:


Mar 27, 2017.


Attending Physician:


Bipin Crawford DO


 (Gail Avila, STERLING)





History of Present Illness


Elisabet is a pleasant 21 year-old male with history of sickle cell anemia 

who originally presented to the emergency room with abdominal pain on 3/23/2016 

and had a CT scan of abdomen and pelvis which showed cholelithiasis and 

decreased size of spleen and improvement in splenic infarct.   He had no 

leukocytosis however his total bilirubin was elevated at 3.3. He was discharged 

home on same day and felt to have a sickle cell crisis however whenever he got 

home he continued to have severe abdominal pain and presented to the emergency 

room again on 3/25/2017.  Elisabet states the pain was an 11 on a scale of 

10.  States the pain was very severe and sharp in the entire abdomen with 

radiation to his back.  Associated nausea and vomiting.  No fevers or chills.  

He states he has never had this type of pain before.  Denies of any fever, 

chills, night sweats, changes in bowel habits, diarrhea, constipation , or 

blood in stools.  He did notice very dark urine as well.  On this ER admission 

he had a leukocytosis of 15.35 and total bilirubin of 14.7 with direct 

bilirubin of 9.4.  Lipase was 18077.  Abdominal ultrasound showed gallstones, 

slight gallbladder wall thickening, and dilated common bile duct at 0.9 cm.  

MRCP however did not show dilated CBD and no choledocholithiasis.  IT did 

however show evidence of peripancreatic fluid and findings suggestive of acute 

pancreatitis. 





Patient states he is feeling better since admission. Still having some right 

and left abdominal pain.  No nausea or vomiting since admission.  Is itchy.  

Urine is lighter. 





As of note, patient's brother has sickle cell anemia as well and had history of 

gallstones in which he required ERCP and gallbladder removal. 


 (Gail Avila PA-C)





Past Medical/Surgical History


Medical Problems:


(1) Central abdominal pain


Status: Acute  





(2) Cholecystitis


Status: Acute  





(3) LUQ abdominal pain


Status: Acute  





(4) Pancreatitis


Status: Acute  





(5) Sickle cell crisis


Status: Acute  





(6) Sickle cell disease


Status: Acute  





(7) Splenic infarct


Status: Acute  





(8) Splenic infarct


Status: Acute  





 (Gail Avila PA-C)





Family History





Diabetes mellitus


FH: heart disease


Hypertension


Kidney disease (Gail Avila PA-C)





Diabetes mellitus


FH: heart disease


Hypertension


Kidney disease (Rustam Cleary M.D.)


Social History


Smoking Status:  Light Tobacco Smoker


Smokeless Tobacco Use:  No


Alcohol Use:  none


Marital Status:  single


Occupation Status:  [employer] student


 (Gail Avila PA-C)





Allergies


Coded Allergies:  


     No Known Allergies (Unverified , 1/8/17)





Home Medications


Scheduled


Hydroxyurea (Hydrea Cap), 500 MG PO DAILY





Scheduled PRN


Ondansetron Hcl (Zofran), 4 MG PO Q6H PRN for Nausea


Oxycodone/Acetaminophen 10MG/325MG (Percocet 10MG/325MG), 1 TAB PO AS DIRECTED 

PRN for Pain





Current Inpatient Medications





 Current Inpatient Medications








 Medications


  (Trade)  Dose


 Ordered  Sig/Nicolas


 Route  Start Time


 Stop Time Status Last Admin


Dose Admin


 


 Piperacillin Sod/


 Tazobactam Sod/


 Dextrose


  (Zosyn Iv/D5


 100ml)  115 ml @ 


 28 mls/hr  Q8H


 IV  3/26/17 04:00


 4/5/17 03:59  3/27/17 12:06


28 MLS/HR


 


 Ondansetron HCl 4


 mg  4 mg  Q6H  PRN


 IV  3/26/17 01:15


 4/25/17 01:14   


 


 


 Pantoprazole


 Sodium 40 mg/


 Syringe  10 ml @ 5


 mls/min  DAILY@11


 IV  3/26/17 11:00


 4/25/17 10:59  3/27/17 10:13


5 MLS/MIN


 


 Promethazine HCl/


 Sodium Chloride


  (Phenergan Inj/


 Nss 50ml)  51 ml @ 


 204 mls/hr  Q6H  PRN


 IV  3/26/17 01:15


 4/25/17 01:14   


 


 


 Diphenhydramine


 HCl


  (Benadryl Inj)  50 mg  Q6H  PRN


 IV  3/26/17 01:30


 4/25/17 01:29  3/27/17 05:15


50 MG


 


 Piperacillin Sod/


 Tazobactam Sod 1


 ea  1 ea  UD  PRN


 N/A  3/26/17 09:30


 4/25/17 09:29   


 


 


 Lactated Ringer's


  (Lr 1000ml)  1,000 ml @ 


 250 mls/hr  Q4H


 IV  3/26/17 10:00


 4/25/17 09:59  3/27/17 10:13


250 MLS/HR


 


 Hydromorphone HCl


  (Dilaudid Inj)  0.5 mg  Q2HWA  PRN


 IV  3/26/17 18:00


 4/9/17 17:59  3/26/17 17:17


0.5 MG


 


 Hydromorphone HCl


  (Dilaudid Inj)  1 mg  Q2HWA  PRN


 IV  3/26/17 18:00


 4/9/17 17:59  3/27/17 12:06


1 MG








 (Gail Avila, PA-C)





Review of Systems


Constitutional:  No chills, No fever, No sweats


Cardiovascular:  No chest pain


Abdomen:  + nausea, + pain, + vomiting, No constipation, No diarrhea


Musculoskeletal:  No joint pain, No muscle pain


Genitourinary - Male:  + problem reported (dark urine), No dysuria, No hematuria

, No urinary frequency, No urinary urgency


Integumentary:  + itch (Gail Avila, PA-C)





Physical Exam











  Date Time  Temp Pulse Resp B/P Pulse Ox O2 Delivery O2 Flow Rate FiO2


 


3/27/17 08:00      Room Air  


 


3/27/17 07:30  94      


 


3/27/17 07:01 37.0 127 18 132/86 100 Room Air  


 


3/26/17 23:26      Room Air  


 


3/26/17 23:20 37.0 98 16 121/78 97 Room Air  


 


3/26/17 20:00      Room Air  


 


3/26/17 15:22 37.2 90 16 116/70 96 Room Air  








General Appearance:  WD/WN, no apparent distress


Head:  normocephalic, atraumatic


Eyes:  + pertinent finding (+ sclera yellow)


ENT:  hearing grossly normal


Neck:  trachea midline


Respiratory/Chest:  lungs clear, normal breath sounds, no respiratory distress, 

no accessory muscle use


Cardiovascular:  regular rate, rhythm, no murmur


Abdomen/GI:  soft, no organomegaly, no pulsatile mass, + tenderness (RUQ and LUQ

)


Neurologic/Psych:  alert, normal mood/affect, oriented x 3


Skin:  warm/dry, no rash, + jaundice


 (Gail Avila, PA-C)





Laboratory Results





Last 24 Hours








Test


  3/27/17


07:37


 


White Blood Count 10.73 K/uL 


 


Red Blood Count 3.90 M/uL 


 


Hemoglobin 10.1 g/dL 


 


Hematocrit 27.8 % 


 


Mean Corpuscular Volume 71.3 fL 


 


Mean Corpuscular Hemoglobin 25.9 pg 


 


Mean Corpuscular Hemoglobin


Concent 36.3 g/dl 


 


 


Platelet Count 192 K/uL 


 


Mean Platelet Volume 10.6 fL 


 


Neutrophils (%) (Auto) 69.5 % 


 


Lymphocytes (%) (Auto) 15.9 % 


 


Monocytes (%) (Auto) 13.0 % 


 


Eosinophils (%) (Auto) 1.0 % 


 


Basophils (%) (Auto) 0.2 % 


 


Neutrophils # (Auto) 7.46 K/uL 


 


Lymphocytes # (Auto) 1.71 K/uL 


 


Monocytes # (Auto) 1.39 K/uL 


 


Eosinophils # (Auto) 0.11 K/uL 


 


Basophils # (Auto) 0.02 K/uL 


 


RDW Standard Deviation 41.7 fL 


 


RDW Coefficient of Variation 16.0 % 


 


Immature Granulocyte % (Auto) 0.4 % 


 


Immature Granulocyte # (Auto) 0.04 K/uL 


 


Prothrombin Time 13.4 SECONDS 


 


Prothromb Time International


Ratio 1.2 


 


 


Activated Partial


Thromboplast Time 34.3 SECONDS 


 


 


Partial Thromboplastin Ratio 1.3 


 


Sodium Level 137 mmol/L 


 


Potassium Level 3.4 mmol/L 


 


Chloride Level 98 mmol/L 


 


Carbon Dioxide Level 29 mmol/L 


 


Anion Gap 10.0 mmol/L 


 


Blood Urea Nitrogen 7 mg/dl 


 


Creatinine 0.51 mg/dl 


 


Est Creatinine Clear Calc


Drug Dose 159.8 ml/min 


 


 


Estimated GFR (


American) > 150.0 


 


 


Estimated GFR (Non-


American > 150.0 


 


 


BUN/Creatinine Ratio 13.5 


 


Random Glucose 68 mg/dl 


 


Calcium Level 8.4 mg/dl 


 


Magnesium Level 1.4 mg/dl 


 


Total Bilirubin 5.5 mg/dl 


 


Direct Bilirubin 2.0 mg/dl 


 


Aspartate Amino Transf


(AST/SGOT) 27 U/L 


 


 


Alanine Aminotransferase


(ALT/SGPT) 70 U/L 


 


 


Alkaline Phosphatase 67 U/L 


 


Total Protein 6.4 gm/dl 


 


Albumin 3.2 gm/dl 


 


Amylase Level 215 U/L 


 


Lipase 1253 U/L 














ULTRASOUND RIGHT UPPER QUADRANT ABDOMEN





CLINICAL HISTORY: Right upper quadrant abdominal pain.





COMPARISON STUDY: Abdominal CT dated 3/23/2017.





TECHNIQUE: Real-time, grayscale, and color flow sonography of the right upper


quadrant of the abdomen was performed. Images are reviewed in the transverse and


longitudinal planes.





FINDINGS:





Liver: The liver is normal in size and echotexture. There is no intrahepatic


biliary ductal dilatation. The main portal vein is patent.





Gallbladder: The gallbladder is distended and filled with sludge and stones. The


gallbladder wall is thickened and hyperemic measuring up to 4 mm. There is a


small volume of free fluid in the right upper quadrant. A sonographic Butts's


sign is reportedly present. The common bile duct measures up to 0.9 cm in


diameter.





Pancreas: Not well visualized due to overlying bowel gas.





Right kidney: Survey images of the right kidney demonstrate normal size and


echotexture. There is no hydronephrosis.





Ascites: A small volume of free fluid is seen in the right upper quadrant.








IMPRESSION: Cholelithiasis and biliary sludge with sonographic evidence of acute


cholecystitis. Surgical consultation is advised.











MRCP





CLINICAL HISTORY: Acute cholecystitis. Abdominal pain.    





COMPARISON STUDY:  CT of the abdomen and pelvis March 23, 2017 and right upper


quadrant ultrasound March 25, 2017.





TECHNIQUE: Utilizing a 1.5 Jazmine magnet and heavily T2 weighted sequences,


multiplanar, multi echo imaging of the abdomen was performed without IV


contrast.





FINDINGS: The splenic infarct is similar appearance to CT of March 23, 2017. No


biliary ductal dilatation is identified. No common bile duct calculi are


identified although sensitivity is diminished due to motion artifact. There has


been interval development of a small to moderate amount of fluid within the


abdomen and pelvis since prior abdominal CT. Peripancreatic fluid is noted.


Sludge and stones are noted within the gallbladder. There is mild gallbladder


wall thickening. Trace bilateral pleural effusion are noted. Unenhanced images


of the liver, adrenal glands and kidneys are normal.





IMPRESSION:  





1.  Interval development of abdominal and pelvic ascites with peripancreatic


infiltration. The findings favor acute pancreatitis. Discussed with Dr. Crawford


at time of dictation.





2. No biliary ductal dilatation. No common bile duct calculi identified although


sensitivity diminished due to motion artifact.





3. Cholelithiasis and mild gallbladder wall thickening which may reflect acute


cholecystitis.





4. No change in appearance of the splenic infarct.





 (Gail Avila ., PA-C)





Assessment & Plan


Gallstone Pancreatitis


   - Leukocytosis resolved


   - Total bilirubin and Direct Bilirubin improving 5.5/2.0 respectively 


   - Lipase trending down, 1253 today


   - +Jaundice


Sickle Cell Anemia








Plan:


Discussed with patient cholecystectomy however will need to allow pancreatitis 

to improve prior to cholecystectomy. Will not plan on laparoscopic 

cholecystectomy for another few days. (Thursday)


Continue IV fluids, IV antibiotics, and conservative management


Continue pain management prn


Repeat am labs, cbc, bmp, LFTs, lipase


Now on clear liquids


Continue management established by GI and Medicine services


will follow








Dr. Cleary has seen and examined patient and agrees with above stated 

findings and treatment plan


 (Gail Avila ., PA-C)


I interviewed and examined this patient and I agree with the above note.  He 

has a history of sickle cell anemia and was admitted with a rather significant 

pancreatitis with significant elevation of LFT's and has developed ascites most 

likely due to cholelithiasis and sludge. He will need a cholecystectomy at some 

point after resolution of the pancreatitis. We will continue to follow and 

determine timing of surgery.


 (Rustam Cleary M.D.)

## 2017-03-27 NOTE — GASTROENTEROLOGY PROGRESS NOTE
Progress Note


Date of Service:  Mar 27, 2017


Subjective


Pt evaluation today including:  conversation w/ patient, physical exam, chart 

review, lab review


Patient was seen and examined this morning. He reports that he is hungry and is 

no longer having any of the pain that brought him to the ED. No nausea, no 

vomiting. No fever, chills. Has been NPO with IVF. He has not had a BM since 

admission but does not feel like he has to go due to limited PO intake. Denies 

any black/bloody stools, hematemesis coffee ground emesis. 





He reports he has been itchy since admission - he believed related to Dilaudid 

use as this has happened with other pain medications. Denies any SOB, facial 

edema with dilaudid Also with elevated TB. Not pruritic today. TB elevation 

resolving, still receiving Dilaudid.





Review of Systems


Constitutional:  No chills, No fever


Respiratory:  No cough, No shortness of breath


Cardiac:  No chest pain, No edema


Abdomen:  No GI bleeding, No constipation, No diarrhea, No nausea, No pain, No 

vomiting





Medications





 Current Inpatient Medications








 Medications


  (Trade)  Dose


 Ordered  Sig/Nicolas


 Route  Start Time


 Stop Time Status Last Admin


Dose Admin


 


 Piperacillin Sod/


 Tazobactam Sod/


 Dextrose


  (Zosyn Iv/D5


 100ml)  115 ml @ 


 28 mls/hr  Q8H


 IV  3/26/17 04:00


 4/5/17 03:59  3/27/17 03:24


28 MLS/HR


 


 Ondansetron HCl 4


 mg  4 mg  Q6H  PRN


 IV  3/26/17 01:15


 4/25/17 01:14   


 


 


 Pantoprazole


 Sodium 40 mg/


 Syringe  10 ml @ 5


 mls/min  DAILY@11


 IV  3/26/17 11:00


 4/25/17 10:59  3/26/17 10:41


5 MLS/MIN


 


 Promethazine HCl/


 Sodium Chloride


  (Phenergan Inj/


 Nss 50ml)  51 ml @ 


 204 mls/hr  Q6H  PRN


 IV  3/26/17 01:15


 4/25/17 01:14   


 


 


 Diphenhydramine


 HCl


  (Benadryl Inj)  50 mg  Q6H  PRN


 IV  3/26/17 01:30


 4/25/17 01:29  3/27/17 05:15


50 MG


 


 Piperacillin Sod/


 Tazobactam Sod 1


 ea  1 ea  UD  PRN


 N/A  3/26/17 09:30


 4/25/17 09:29   


 


 


 Lactated Ringer's


  (Lr 1000ml)  1,000 ml @ 


 250 mls/hr  Q4H


 IV  3/26/17 10:00


 4/25/17 09:59  3/27/17 05:14


250 MLS/HR


 


 Hydromorphone HCl


  (Dilaudid Inj)  0.5 mg  Q2HWA  PRN


 IV  3/26/17 18:00


 4/9/17 17:59  3/26/17 17:17


0.5 MG


 


 Hydromorphone HCl


 1 mg  1 mg  Q2HWA  PRN


 IV  3/26/17 18:00


 4/9/17 17:59  3/27/17 08:05


1 MG


 


 Magnesium Sulfate/


 Prmx


  (Magnesium


 Sulfate/Premixed


 D5W)  100 ml @ 


 100 mls/hr  NOW  ONCE


 IV  3/27/17 10:00


 3/27/17 10:59   


 











Objective


Vital Signs











  Date Time  Temp Pulse Resp B/P Pulse Ox O2 Delivery O2 Flow Rate FiO2


 


3/27/17 08:00      Room Air  


 


3/27/17 07:30  94      


 


3/27/17 07:01 37.0 127 18 132/86 100 Room Air  


 


3/26/17 23:26      Room Air  


 


3/26/17 23:20 37.0 98 16 121/78 97 Room Air  


 


3/26/17 20:00      Room Air  


 


3/26/17 15:22 37.2 90 16 116/70 96 Room Air  











Physical Exam


General Appearance:  no apparent distress


Eyes:  PERRL


ENT:  hearing grossly normal


Neck:  supple


Respiratory/Chest:  lungs clear, normal breath sounds, no respiratory distress, 

no accessory muscle use


Cardiovascular:  regular rate, rhythm, no JVD, no murmur


Abdomen:  normal bowel sounds, non tender, soft, no organomegaly, no pulsatile 

mass


Neurologic/Psych:  alert, normal mood/affect, oriented x 3


Skin:  normal color, warm/dry, no rash





Laboratory Results





Last 24 Hours








Test


  3/26/17


10:09 3/27/17


07:37


 


White Blood Count 14.04 K/uL  10.73 K/uL 


 


Red Blood Count 4.61 M/uL  3.90 M/uL 


 


Hemoglobin 11.7 g/dL  10.1 g/dL 


 


Hematocrit 31.8 %  27.8 % 


 


Mean Corpuscular Volume 69.0 fL  71.3 fL 


 


Mean Corpuscular Hemoglobin 25.4 pg  25.9 pg 


 


Mean Corpuscular Hemoglobin


Concent 36.8 g/dl 


  36.3 g/dl 


 


 


Platelet Count 222 K/uL  192 K/uL 


 


Neutrophils (%) (Auto) 81.9 %  69.5 % 


 


Lymphocytes (%) (Auto) 8.3 %  15.9 % 


 


Monocytes (%) (Auto) 9.0 %  13.0 % 


 


Eosinophils (%) (Auto) 0.3 %  1.0 % 


 


Basophils (%) (Auto) 0.1 %  0.2 % 


 


Neutrophils # (Auto) 11.51 K/uL  7.46 K/uL 


 


Lymphocytes # (Auto) 1.17 K/uL  1.71 K/uL 


 


Monocytes # (Auto) 1.26 K/uL  1.39 K/uL 


 


Eosinophils # (Auto) 0.04 K/uL  0.11 K/uL 


 


Basophils # (Auto) 0.01 K/uL  0.02 K/uL 


 


RDW Standard Deviation 39.0 fL  41.7 fL 


 


RDW Coefficient of Variation 15.8 %  16.0 % 


 


Immature Granulocyte % (Auto) 0.4 %  0.4 % 


 


Immature Granulocyte # (Auto) 0.05 K/uL  0.04 K/uL 


 


Polychromasia 1+  


 


Microcytosis PRESENT  


 


Target Cells 1+  


 


Sodium Level 137 mmol/L  137 mmol/L 


 


Potassium Level 3.5 mmol/L  3.4 mmol/L 


 


Chloride Level 100 mmol/L  98 mmol/L 


 


Carbon Dioxide Level 27 mmol/L  29 mmol/L 


 


Anion Gap 10.0 mmol/L  10.0 mmol/L 


 


Blood Urea Nitrogen 5 mg/dl  7 mg/dl 


 


Creatinine 0.66 mg/dl  0.51 mg/dl 


 


Est Creatinine Clear Calc


Drug Dose 123.5 ml/min 


  159.8 ml/min 


 


 


Estimated GFR (


American) > 150.0 


  > 150.0 


 


 


Estimated GFR (Non-


American 140.2 


  > 150.0 


 


 


BUN/Creatinine Ratio 7.0  13.5 


 


Random Glucose 79 mg/dl  68 mg/dl 


 


Calcium Level 8.2 mg/dl  8.4 mg/dl 


 


Total Bilirubin 9.9 mg/dl  5.5 mg/dl 


 


Direct Bilirubin 5.1 mg/dl  2.0 mg/dl 


 


Aspartate Amino Transf


(AST/SGOT) 51 U/L 


  27 U/L 


 


 


Alanine Aminotransferase


(ALT/SGPT) 110 U/L 


  70 U/L 


 


 


Alkaline Phosphatase 85 U/L  67 U/L 


 


Total Protein 7.0 gm/dl  6.4 gm/dl 


 


Albumin 3.7 gm/dl  3.2 gm/dl 


 


Globulin 3.3 gm/dl  


 


Albumin/Globulin Ratio 1.1  


 


Mean Platelet Volume  10.6 fL 


 


Prothrombin Time  13.4 SECONDS 


 


Prothromb Time International


Ratio 


  1.2 


 


 


Activated Partial


Thromboplast Time 


  34.3 SECONDS 


 


 


Partial Thromboplastin Ratio  1.3 


 


Magnesium Level  1.4 mg/dl 


 


Amylase Level  215 U/L 


 


Lipase  1253 U/L 











Assessment and Plan


Patient is a 21 year old male with sickle cell disease with moderately severe 

gallstone pancreatitis. Today he is no longer having any epigastric pain or 

nausea. Labs continue to improve. TB 5.5, DB 2 AST 27 ALT 70 ALKP 67. He is 

requesting food.





Continue IVF to continue with LR at 250 cc/hr until 3/28/17


Clear liquids as tolerated. May advance to low fat diet 3/28/17 if doing well 

with clear liquids.


IV Pain medications as needed


Continue Zosyn


LFT's while admitted


Please call with any signs of decompensation, such as hypotension, fevers, or 

chills - will need repeat CT to exclude necrosis, pancreatic fluid collections 

at that time


Consult surgery for cholecystectomy evaluation





GI to sign off. Call with questions.








I have seen and examined the patient with Lizette Goyal whose note 

reflect sour findings and plan.

## 2017-03-28 VITALS
TEMPERATURE: 98.06 F | OXYGEN SATURATION: 96 % | SYSTOLIC BLOOD PRESSURE: 117 MMHG | HEART RATE: 98 BPM | DIASTOLIC BLOOD PRESSURE: 75 MMHG

## 2017-03-28 VITALS
DIASTOLIC BLOOD PRESSURE: 63 MMHG | TEMPERATURE: 97.7 F | OXYGEN SATURATION: 98 % | HEART RATE: 67 BPM | SYSTOLIC BLOOD PRESSURE: 100 MMHG

## 2017-03-28 VITALS — OXYGEN SATURATION: 96 %

## 2017-03-28 VITALS
OXYGEN SATURATION: 93 % | DIASTOLIC BLOOD PRESSURE: 74 MMHG | SYSTOLIC BLOOD PRESSURE: 111 MMHG | HEART RATE: 74 BPM | TEMPERATURE: 98.24 F

## 2017-03-28 LAB
ALP SERPL-CCNC: 62 U/L (ref 45–117)
ALT SERPL-CCNC: 57 U/L (ref 12–78)
AMYLASE SERPL-CCNC: 89 U/L (ref 25–115)
ANION GAP SERPL CALC-SCNC: 6 MMOL/L (ref 3–11)
AST SERPL-CCNC: 28 U/L (ref 15–37)
BASOPHILS # BLD: 0.03 K/UL (ref 0–0.2)
BASOPHILS NFR BLD: 0.5 %
BUN SERPL-MCNC: 4 MG/DL (ref 7–18)
BUN/CREAT SERPL: 7.1 (ref 10–20)
CALCIUM SERPL-MCNC: 8.6 MG/DL (ref 8.5–10.1)
CHLORIDE SERPL-SCNC: 101 MMOL/L (ref 98–107)
CO2 SERPL-SCNC: 32 MMOL/L (ref 21–32)
COMPLETE: YES
CREAT CL PREDICTED SERPL C-G-VRATE: 166.3 ML/MIN
CREAT SERPL-MCNC: 0.49 MG/DL (ref 0.6–1.4)
EOSINOPHIL NFR BLD AUTO: 175 K/UL (ref 130–400)
GLUCOSE SERPL-MCNC: 64 MG/DL (ref 70–99)
HCT VFR BLD CALC: 25.6 % (ref 42–52)
IG%: 0.2 %
IMM GRANULOCYTES NFR BLD AUTO: 29.9 %
INR PPP: 1.1 (ref 0.9–1.1)
LG PLATELETS BLD QL SMEAR: (no result)
LYMPHOCYTES # BLD: 1.83 K/UL (ref 1.2–3.4)
MAGNESIUM SERPL-MCNC: 1.7 MG/DL (ref 1.8–2.4)
MCH RBC QN AUTO: 25.3 PG (ref 25–34)
MCHC RBC AUTO-ENTMCNC: 36.3 G/DL (ref 32–36)
MCV RBC AUTO: 69.6 FL (ref 80–100)
MICROCYTES BLD QL SMEAR: PRESENT
MONOCYTES NFR BLD: 14.5 %
NEUTROPHILS # BLD AUTO: 4.4 %
NEUTROPHILS NFR BLD AUTO: 50.5 %
PARTIAL THROMBOPLASTIN RATIO: 1.3
POLYCHROMASIA BLD QL SMEAR: (no result)
POTASSIUM SERPL-SCNC: 3.7 MMOL/L (ref 3.5–5.1)
PROTHROMBIN TIME: 12 SECONDS (ref 9–12)
RBC # BLD AUTO: 3.68 M/UL (ref 4.7–6.1)
SODIUM SERPL-SCNC: 139 MMOL/L (ref 136–145)
TARGETS BLD QL SMEAR: (no result)
WBC # BLD AUTO: 6.12 K/UL (ref 4.8–10.8)

## 2017-03-28 RX ADMIN — HYDROMORPHONE HYDROCHLORIDE PRN MG: 1 INJECTION, SOLUTION INTRAMUSCULAR; INTRAVENOUS; SUBCUTANEOUS at 03:21

## 2017-03-28 RX ADMIN — SODIUM CHLORIDE, SODIUM LACTATE, POTASSIUM CHLORIDE, CALCIUM CHLORIDE AND DEXTROSE MONOHYDRATE SCH MLS/HR: 5; 600; 310; 30; 20 INJECTION, SOLUTION INTRAVENOUS at 16:02

## 2017-03-28 RX ADMIN — PANTOPRAZOLE SODIUM SCH MLS/MIN: 40 INJECTION, POWDER, FOR SOLUTION INTRAVENOUS at 10:34

## 2017-03-28 RX ADMIN — MORPHINE SULFATE PRN MG: 2 INJECTION, SOLUTION INTRAMUSCULAR; INTRAVENOUS at 10:39

## 2017-03-28 RX ADMIN — PIPERACILLIN AND TAZOBACTAM SCH MLS/HR: 3; .375 INJECTION, POWDER, LYOPHILIZED, FOR SOLUTION INTRAVENOUS; PARENTERAL at 03:21

## 2017-03-28 RX ADMIN — SODIUM CHLORIDE, SODIUM LACTATE, POTASSIUM CHLORIDE, CALCIUM CHLORIDE AND DEXTROSE MONOHYDRATE SCH MLS/HR: 5; 600; 310; 30; 20 INJECTION, SOLUTION INTRAVENOUS at 12:02

## 2017-03-28 RX ADMIN — HYDROMORPHONE HYDROCHLORIDE PRN MG: 1 INJECTION, SOLUTION INTRAMUSCULAR; INTRAVENOUS; SUBCUTANEOUS at 01:25

## 2017-03-28 RX ADMIN — HYDROMORPHONE HYDROCHLORIDE PRN MG: 1 INJECTION, SOLUTION INTRAMUSCULAR; INTRAVENOUS; SUBCUTANEOUS at 08:07

## 2017-03-28 RX ADMIN — SODIUM CHLORIDE, SODIUM LACTATE, POTASSIUM CHLORIDE, AND CALCIUM CHLORIDE SCH MLS/HR: 600; 310; 30; 20 INJECTION, SOLUTION INTRAVENOUS at 02:15

## 2017-03-28 RX ADMIN — SODIUM CHLORIDE, SODIUM LACTATE, POTASSIUM CHLORIDE, CALCIUM CHLORIDE AND DEXTROSE MONOHYDRATE SCH MLS/HR: 5; 600; 310; 30; 20 INJECTION, SOLUTION INTRAVENOUS at 21:52

## 2017-03-28 RX ADMIN — PIPERACILLIN AND TAZOBACTAM SCH MLS/HR: 3; .375 INJECTION, POWDER, LYOPHILIZED, FOR SOLUTION INTRAVENOUS; PARENTERAL at 12:02

## 2017-03-28 RX ADMIN — MORPHINE SULFATE PRN MG: 2 INJECTION, SOLUTION INTRAMUSCULAR; INTRAVENOUS at 21:51

## 2017-03-28 RX ADMIN — MORPHINE SULFATE PRN MG: 2 INJECTION, SOLUTION INTRAMUSCULAR; INTRAVENOUS at 18:03

## 2017-03-28 RX ADMIN — HYDROMORPHONE HYDROCHLORIDE PRN MG: 1 INJECTION, SOLUTION INTRAMUSCULAR; INTRAVENOUS; SUBCUTANEOUS at 05:45

## 2017-03-28 RX ADMIN — SODIUM CHLORIDE, SODIUM LACTATE, POTASSIUM CHLORIDE, AND CALCIUM CHLORIDE SCH MLS/HR: 600; 310; 30; 20 INJECTION, SOLUTION INTRAVENOUS at 05:46

## 2017-03-28 RX ADMIN — PIPERACILLIN AND TAZOBACTAM SCH MLS/HR: 3; .375 INJECTION, POWDER, LYOPHILIZED, FOR SOLUTION INTRAVENOUS; PARENTERAL at 20:12

## 2017-03-28 NOTE — SURGERY PROGRESS NOTE
Surgery Progress Note


Date of Service


Mar 28, 2017.





Subjective


Post OP Day:  HD # 2


+ diet (tolerating clear liquids), + flatus, + nausea, + pain controlled, No SOB

, No bowel movement, No chest pain, No vomiting


"Feeling better than yesterday, abdominal pain not as bad"


slight nausea after drinking apple juice yesterday





Objective


Vital Signs:











  Date Time  Temp Pulse Resp B/P Pulse Ox O2 Delivery O2 Flow Rate FiO2


 


3/28/17 08:15      Room Air  


 


3/28/17 07:55 36.8 74 16 111/74 93 Room Air  


 


3/27/17 23:25 36.8 92 15 132/70 99 Room Air  


 


3/27/17 23:20      Room Air  


 


3/27/17 15:38 37.2 93 17 126/68 96 Room Air  


 


3/27/17 15:15      Room Air  








General Appearance:  WD/WN, no apparent distress, + pertinent finding (+ 

jaundice, sclera yellow)


Head:  normocephalic, atraumatic


Neck:  trachea midline


Respiratory/Chest:  lungs clear, normal breath sounds, no respiratory distress, 

no accessory muscle use


Cardiovascular:  regular rate, rhythm, no murmur


Abdomen:  non tender, non distended, soft, no organomegaly, no pulsatile mass


Laboratory Results:





Results Past 24 Hours








Test


  3/28/17


06:37 Range/Units


 


 


White Blood Count 6.12 4.8-10.8  K/uL


 


Red Blood Count 3.68 4.7-6.1  M/uL


 


Hemoglobin 9.3 14.0-18.0  g/dL


 


Hematocrit 25.6 42-52  %


 


Mean Corpuscular Volume 69.6   fL


 


Mean Corpuscular Hemoglobin 25.3 25-34  pg


 


Mean Corpuscular Hemoglobin


Concent 36.3


  32-36  g/dl


 


 


Platelet Count 175 130-400  K/uL


 


Neutrophils (%) (Auto) 50.5  %


 


Lymphocytes (%) (Auto) 29.9  %


 


Monocytes (%) (Auto) 14.5  %


 


Eosinophils (%) (Auto) 4.4  %


 


Basophils (%) (Auto) 0.5  %


 


Neutrophils # (Auto) 3.09 1.4-6.5  K/uL


 


Lymphocytes # (Auto) 1.83 1.2-3.4  K/uL


 


Monocytes # (Auto) 0.89 0.11-0.59  K/uL


 


Eosinophils # (Auto) 0.27 0-0.5  K/uL


 


Basophils # (Auto) 0.03 0-0.2  K/uL


 


RDW Standard Deviation 39.1 36.4-46.3  fL


 


RDW Coefficient of Variation 15.6 11.5-14.5  %


 


Immature Granulocyte % (Auto) 0.2  %


 


Immature Granulocyte # (Auto) 0.01 0.00-0.02  K/uL


 


Large Platelets 1+  


 


Polychromasia 1+  


 


Microcytosis PRESENT  


 


Target Cells 2+  


 


Prothrombin Time


  12.0


  9.0-12.0


SECONDS


 


Prothromb Time International


Ratio 1.1


  0.9-1.1  


 


 


Activated Partial


Thromboplast Time 33.9


  21.0-31.0


SECONDS


 


Partial Thromboplastin Ratio 1.3  


 


Sodium Level 139 136-145  mmol/L


 


Potassium Level 3.7 3.5-5.1  mmol/L


 


Chloride Level 101   mmol/L


 


Carbon Dioxide Level 32 21-32  mmol/L


 


Anion Gap 6.0 3-11  mmol/L


 


Blood Urea Nitrogen 4 7-18  mg/dl


 


Creatinine


  0.49


  0.60-1.40


mg/dl


 


Est Creatinine Clear Calc


Drug Dose 166.3


   ml/min


 


 


Estimated GFR (


American) > 150.0


   


 


 


Estimated GFR (Non-


American > 150.0


   


 


 


BUN/Creatinine Ratio 7.1 10-20  


 


Random Glucose 64 70-99  mg/dl


 


Calcium Level 8.6 8.5-10.1  mg/dl


 


Magnesium Level 1.7 1.8-2.4  mg/dl


 


Total Bilirubin 4.3 0.2-1  mg/dl


 


Direct Bilirubin 1.6 0-0.2  mg/dl


 


Aspartate Amino Transf


(AST/SGOT) 28


  15-37  U/L


 


 


Alanine Aminotransferase


(ALT/SGPT) 57


  12-78  U/L


 


 


Alkaline Phosphatase 62   U/L


 


Total Protein 6.6 6.4-8.2  gm/dl


 


Albumin 3.3 3.4-5.0  gm/dl


 


Amylase Level 89   U/L


 


Lipase 595   U/L











Assessment & Plan


Acute Gallstone Pancreatitis


   - vital signs stable


   - Abdominal pain improving and controlled with Dilaudid


   - tolerating clear liquids


   - Total bilirubin 4.3, Direct bili- 1.6, and Lipase 595 (all trending down)








Plan:


Diet advanced per GI and Medicine regarding pancreatitis


Pancreatic enzyme and total bilirubin improving


Will continue conservative management established


Change Dilaudid to Morphine per patient request


Again would like a few more days to improve pancreatitis prior to laparoscopic 

cholecystectomy if patient decides to have surgery here.


Repeat am labs 








Discussed with Dr. Cleary, will see patient later today

## 2017-03-28 NOTE — HEME/ONC PROGRESS NOTE
DATE: 03/28/2017

 

DIAGNOSES:

1.  Abdominal pain.

2.  Pancreatitis.

3.  Cholecystitis.

4.  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

student well known to the [facility] who suffers from hemoglobin

S with hereditary persistent fetal hemoglobin followed by Dr. Diaz.  He

has had intermittent abdominal pain with and without nausea over the past

couple of weeks.  He was admitted about a week prior to his most recent

admission thought to be suffering from a sickle crisis.  He was seen in the

interim by Dr. Diaz and recommended starting hydroxyurea.  He was 

admitted with return of abdominal pain, amylase and lipase consistent with

pancreatitis and ultrasound confirms cholecystitis with gallbladder wall

thickening.  Surgical consultation has been completed and elective surgery is

recommended.  Clinically, Mr. Ariza feels well today, says his pain is

completely resolved.

 

PHYSICAL EXAMINATION:

GENERAL:  He is in no acute distress.

VITAL SIGNS:  Temperature 36.8, pulse 74, respiration 16, blood pressure

111/74.

SKIN:  Without rash or lesion.

HEENT:  Oral mucosa without erythema or ulceration.

NECK:  Supple.

HEART:  Regular rate and rhythm.  No clicks, rubs, murmurs, gallops.

LUNGS:  Clear.

ABDOMEN:  Soft, nontender, nondistended.  Bowel sounds are active.

EXTREMITIES:  No calf tenderness or swelling.

NEUROLOGIC:  Grossly intact.

 

LABORATORY DATA:  WBC count NL, hemoglobin 9.3, platelet count 175,000. 

Amylase 89, lipase 595, direct bilirubin 1.6, magnesium 1.7.  Sodium 139,

potassium 3.7, chloride 101, carbon dioxide 32, creatinine 0.49, and BUN 4.

 

IMPRESSION:

1.  Acute pancreatitis.

2.  Cholecystitis/cholelithiasis.

3.  Hemoglobin S with persistent fetal hemoglobin.

 

PLAN:  Agree with Dr. Diaz's assessment that this gentleman will

eventually need surgery.  From a hematologic standpoint, no current support

is necessary.  He has been advised to hold off on initiating hydroxyurea

until he undergoes surgery.  According to nursing staff, he is still deciding

whether to have surgery done here at [facility].  Will continue to follow

him periodically throughout his hospital stay.  Will also ensure that he has

appropriate outpatient followup at [facility].

 

Thank you again for allowing us to participate in his care.

 

 

 

 

KAMERON

## 2017-03-28 NOTE — HOSPITALIST PROGRESS NOTE
Hospitalist Progress Note


Date of Service


Mar 28, 2017.


 (Cher Rowley ., STERLING)





Subjective


Pt evaluation today including:  conversation w/ patient, physical exam, chart 

review, lab review, review of inpatient medication list


Pain:  None


PO Intake:  Tolerating clear liquid diet


Voiding:  no voiding problems


Patient reports feeling much better.  He states that he has no abdominal pain 

currently and has not required any IV pain medication for several hours.  He is 

tolerating a clear liquid diet well without any nausea or vomiting.  The 

patient denies weakness and fatigued today and would like to advance his diet.  

The patient still complains of pruritus.  He states that this has happened in 

the past when he takes oxycodone, and that this has occurred even prior to this 

acute pancreatitis episode and elevated bilirubin.  The patient denies fevers, 

chills, sweats, chest pain, palpitations, claudication, cough, wheezing, 

shortness of breath, nausea, vomiting, abdominal pain, dysuria, hematuria, 

urinary retention, paralysis, weakness, numbness and tingling.





   Additional Comments:


See HPI for pertinent positives and negatives.  All other systems reviewed and 

negative.


 (Cher Rowley ., PA-C)





Objective


Vital Signs











  Date Time  Temp Pulse Resp B/P Pulse Ox O2 Delivery O2 Flow Rate FiO2


 


3/28/17 08:15      Room Air  


 


3/28/17 07:55 36.8 74 16 111/74 93 Room Air  


 


3/27/17 23:25 36.8 92 15 132/70 99 Room Air  


 


3/27/17 23:20      Room Air  


 


3/27/17 15:38 37.2 93 17 126/68 96 Room Air  


 


3/27/17 15:15      Room Air  








 (Cher Rowley, PA-C)





Physical Exam


General Appearance:  WD/WN, no apparent distress, + thin


Eyes:  PERRL, EOMI, + pertinent finding (icterus)


ENT:  normal ENT inspection, hearing grossly normal, pharynx normal


Neck:  supple, no JVD, trachea midline


Respiratory/Chest:  lungs clear, normal breath sounds, no respiratory distress


Cardiovascular:  regular rate, rhythm, no gallop, no murmur


Abdomen:  normal bowel sounds, non tender, soft


Extremities:  non-tender, normal inspection, no pedal edema


Neurologic/Psychiatric:  alert, normal mood/affect, oriented x 3


Skin:  normal color, warm/dry, no rash


 (Cher Rowley ., PA-C)





Laboratory Results





Last 24 Hours








Test


  3/28/17


06:37


 


White Blood Count 6.12 K/uL 


 


Red Blood Count 3.68 M/uL 


 


Hemoglobin 9.3 g/dL 


 


Hematocrit 25.6 % 


 


Mean Corpuscular Volume 69.6 fL 


 


Mean Corpuscular Hemoglobin 25.3 pg 


 


Mean Corpuscular Hemoglobin


Concent 36.3 g/dl 


 


 


Platelet Count 175 K/uL 


 


Neutrophils (%) (Auto) 50.5 % 


 


Lymphocytes (%) (Auto) 29.9 % 


 


Monocytes (%) (Auto) 14.5 % 


 


Eosinophils (%) (Auto) 4.4 % 


 


Basophils (%) (Auto) 0.5 % 


 


Neutrophils # (Auto) 3.09 K/uL 


 


Lymphocytes # (Auto) 1.83 K/uL 


 


Monocytes # (Auto) 0.89 K/uL 


 


Eosinophils # (Auto) 0.27 K/uL 


 


Basophils # (Auto) 0.03 K/uL 


 


RDW Standard Deviation 39.1 fL 


 


RDW Coefficient of Variation 15.6 % 


 


Immature Granulocyte % (Auto) 0.2 % 


 


Immature Granulocyte # (Auto) 0.01 K/uL 


 


Large Platelets 1+ 


 


Polychromasia 1+ 


 


Microcytosis PRESENT 


 


Target Cells 2+ 


 


Prothrombin Time 12.0 SECONDS 


 


Prothromb Time International


Ratio 1.1 


 


 


Activated Partial


Thromboplast Time 33.9 SECONDS 


 


 


Partial Thromboplastin Ratio 1.3 


 


Sodium Level 139 mmol/L 


 


Potassium Level 3.7 mmol/L 


 


Chloride Level 101 mmol/L 


 


Carbon Dioxide Level 32 mmol/L 


 


Anion Gap 6.0 mmol/L 


 


Blood Urea Nitrogen 4 mg/dl 


 


Creatinine 0.49 mg/dl 


 


Est Creatinine Clear Calc


Drug Dose 166.3 ml/min 


 


 


Estimated GFR (


American) > 150.0 


 


 


Estimated GFR (Non-


American > 150.0 


 


 


BUN/Creatinine Ratio 7.1 


 


Random Glucose 64 mg/dl 


 


Calcium Level 8.6 mg/dl 


 


Magnesium Level 1.7 mg/dl 


 


Total Bilirubin 4.3 mg/dl 


 


Direct Bilirubin 1.6 mg/dl 


 


Aspartate Amino Transf


(AST/SGOT) 28 U/L 


 


 


Alanine Aminotransferase


(ALT/SGPT) 57 U/L 


 


 


Alkaline Phosphatase 62 U/L 


 


Total Protein 6.6 gm/dl 


 


Albumin 3.3 gm/dl 


 


Amylase Level 89 U/L 


 


Lipase 595 U/L 








 (Cher Rowley ., PA-C)





Assessment and Plan


20 y/o male with a history of sickle cell anemia presents to the ED on 3/25 

with abdominal pain, nausea, and vomiting.





Acute gallstone pancreatitis/acute cholecystitis--improving.  Pt met sepsis 

criteria on admission with tachycardia, WBC >12K and source of infection


-Admit to [facility]


-IV fluids changed to D5 + lactated Ringer's at 200 cc/hr due to blood sugar of 

64 this morning


-GI consulted, appreciate recs:  Start clear liquid diet.  If tolerated, 

patient may start low-fat diet on 3/28.  Continue IVF at 250 cc/hr until 3/28.  

GI signed off.


-Consult general surgery for cholecystectomy evaluation, appreciate recs:  

Cholecystectomy after pancreatitis improves/resolves.  Continue current 

treatment. 


-Continue IV Zosyn


-Patient tolerating clear liquid diet well


-Dilaudid changed to morphine per patient request


-Continue nausea control with IV Zofran and Phenergan


-Continue Protonix 40 mg IV qd


-LFTs continued to improve.  Total bilirubin 4.3 on 3/28, improved from 5.5.  

AST and ALT remain within normal limits


-Lipase continues to improve.  Lipase 595 on 3/20, improved from 1253.





Hypomagnesemia--improving


-Magnesium 1.4 on 3/27


-Given magnesium sulfate 1 g IV 1


-Magnesium 1.7 on 3/28


-Give another dose of magnesium sulfate 1 g IV, recheck in the morning


-Very mild hypokalemia on 3/27 at 3.4.  Magnesium is improving, potassium is 

also improved to 3.7 on 3/20





Sickle cell anemia--stable


-Hgb stable at 9.3 on 3/28


-Dr. Diaz consulted, appreciate recs:  hold hydroxyurea until surgery





DVT prophylaxis


-SCDs





Code Status


-Level I, FULL RESUSCITATION STATUS








This chart was completed in part utilizing Dragon Speech Voice Recognition 

software. Attempts were made to minimize the grammatical errors, random word 

insertions, pronoun errors and incomplete sentences. Any formal questions or 

concerns about the content, text or information contained within the body of 

this dictation should be directly addressed to the provider for clarification.


 (Cher Rowley ., ST[place])


Attending Attestation:


Pt seen/examined, chart reviewed, care plan d/w LAMONTE Rowley.


I agree w/ the key components of her documentation.





Pt feels better.


Less abd pain.


No nausea or emesis. 


No stool.  





VSS


no fever


 


gen - thin but NAD


eyes - icterus


mouth - MMM


heart - RRR


lungs - CTA b/l 


abd - minimal tenderness epigastric region, BS+, no HSM





lipase 500s


lfts improved


Hb mid 9's





A/P:


1.  acute gallstone pancreatitis - improved


2.  cholecystitis 2nd to heme gallstones 


3.  sickle cell disease variant 


4.  acute/chronic anemia 


5.  hypoglycemia





add dextrose due to #5


clear liquids


ok to lower fluid rate to 200/hr


labs in AM


plan for OR on Thursday for lap marisabel


cont IV abx in meantime





DAGMAR CLARKE MD


 (Jhon Clarke MD)

## 2017-03-29 VITALS
TEMPERATURE: 98.24 F | SYSTOLIC BLOOD PRESSURE: 99 MMHG | OXYGEN SATURATION: 98 % | HEART RATE: 59 BPM | DIASTOLIC BLOOD PRESSURE: 63 MMHG

## 2017-03-29 VITALS
SYSTOLIC BLOOD PRESSURE: 113 MMHG | HEART RATE: 102 BPM | OXYGEN SATURATION: 96 % | DIASTOLIC BLOOD PRESSURE: 71 MMHG | TEMPERATURE: 98.06 F

## 2017-03-29 LAB
ALP SERPL-CCNC: 58 U/L (ref 45–117)
ALT SERPL-CCNC: 49 U/L (ref 12–78)
ANION GAP SERPL CALC-SCNC: 7 MMOL/L (ref 3–11)
AST SERPL-CCNC: 22 U/L (ref 15–37)
BASOPHILS # BLD: 0.03 K/UL (ref 0–0.2)
BASOPHILS NFR BLD: 0.8 %
BUN SERPL-MCNC: 2 MG/DL (ref 7–18)
BUN/CREAT SERPL: 3.2 (ref 10–20)
CALCIUM SERPL-MCNC: 8.7 MG/DL (ref 8.5–10.1)
CHLORIDE SERPL-SCNC: 105 MMOL/L (ref 98–107)
CO2 SERPL-SCNC: 30 MMOL/L (ref 21–32)
COMPLETE: YES
CREAT CL PREDICTED SERPL C-G-VRATE: 163 ML/MIN
CREAT SERPL-MCNC: 0.5 MG/DL (ref 0.6–1.4)
EOSINOPHIL NFR BLD AUTO: 204 K/UL (ref 130–400)
GLUCOSE SERPL-MCNC: 146 MG/DL (ref 70–99)
HCT VFR BLD CALC: 26 % (ref 42–52)
IG%: 0 %
IMM GRANULOCYTES NFR BLD AUTO: 39.4 %
LG PLATELETS BLD QL SMEAR: (no result)
LYMPHOCYTES # BLD: 1.53 K/UL (ref 1.2–3.4)
MAGNESIUM SERPL-MCNC: 1.7 MG/DL (ref 1.8–2.4)
MCH RBC QN AUTO: 25.1 PG (ref 25–34)
MCHC RBC AUTO-ENTMCNC: 36.2 G/DL (ref 32–36)
MCV RBC AUTO: 69.3 FL (ref 80–100)
MICROCYTES BLD QL SMEAR: PRESENT
MONOCYTES NFR BLD: 16.2 %
NEUTROPHILS # BLD AUTO: 6.7 %
NEUTROPHILS NFR BLD AUTO: 36.9 %
PMV BLD AUTO: 10.7 FL (ref 7.4–10.4)
POTASSIUM SERPL-SCNC: 3.6 MMOL/L (ref 3.5–5.1)
RBC # BLD AUTO: 3.75 M/UL (ref 4.7–6.1)
SODIUM SERPL-SCNC: 142 MMOL/L (ref 136–145)
TARGETS BLD QL SMEAR: (no result)
WBC # BLD AUTO: 3.88 K/UL (ref 4.8–10.8)

## 2017-03-29 RX ADMIN — SODIUM CHLORIDE, SODIUM LACTATE, POTASSIUM CHLORIDE, CALCIUM CHLORIDE AND DEXTROSE MONOHYDRATE SCH MLS/HR: 5; 600; 310; 30; 20 INJECTION, SOLUTION INTRAVENOUS at 11:08

## 2017-03-29 RX ADMIN — SODIUM CHLORIDE, SODIUM LACTATE, POTASSIUM CHLORIDE, CALCIUM CHLORIDE AND DEXTROSE MONOHYDRATE SCH MLS/HR: 5; 600; 310; 30; 20 INJECTION, SOLUTION INTRAVENOUS at 06:03

## 2017-03-29 RX ADMIN — PIPERACILLIN AND TAZOBACTAM SCH MLS/HR: 3; .375 INJECTION, POWDER, LYOPHILIZED, FOR SOLUTION INTRAVENOUS; PARENTERAL at 19:50

## 2017-03-29 RX ADMIN — PIPERACILLIN AND TAZOBACTAM SCH MLS/HR: 3; .375 INJECTION, POWDER, LYOPHILIZED, FOR SOLUTION INTRAVENOUS; PARENTERAL at 04:05

## 2017-03-29 RX ADMIN — Medication SCH MG: at 21:32

## 2017-03-29 RX ADMIN — Medication SCH MG: at 14:37

## 2017-03-29 RX ADMIN — MORPHINE SULFATE PRN MG: 2 INJECTION, SOLUTION INTRAMUSCULAR; INTRAVENOUS at 12:23

## 2017-03-29 RX ADMIN — PANTOPRAZOLE SODIUM SCH MLS/MIN: 40 INJECTION, POWDER, FOR SOLUTION INTRAVENOUS at 11:01

## 2017-03-29 RX ADMIN — SODIUM CHLORIDE, SODIUM LACTATE, POTASSIUM CHLORIDE, AND CALCIUM CHLORIDE SCH MLS/HR: 600; 310; 30; 20 INJECTION, SOLUTION INTRAVENOUS at 22:17

## 2017-03-29 RX ADMIN — MORPHINE SULFATE PRN MG: 2 INJECTION, SOLUTION INTRAMUSCULAR; INTRAVENOUS at 01:12

## 2017-03-29 RX ADMIN — SODIUM CHLORIDE, SODIUM LACTATE, POTASSIUM CHLORIDE, AND CALCIUM CHLORIDE SCH MLS/HR: 600; 310; 30; 20 INJECTION, SOLUTION INTRAVENOUS at 12:11

## 2017-03-29 RX ADMIN — SODIUM CHLORIDE, SODIUM LACTATE, POTASSIUM CHLORIDE, CALCIUM CHLORIDE AND DEXTROSE MONOHYDRATE SCH MLS/HR: 5; 600; 310; 30; 20 INJECTION, SOLUTION INTRAVENOUS at 01:11

## 2017-03-29 RX ADMIN — PIPERACILLIN AND TAZOBACTAM SCH MLS/HR: 3; .375 INJECTION, POWDER, LYOPHILIZED, FOR SOLUTION INTRAVENOUS; PARENTERAL at 12:10

## 2017-03-29 NOTE — HOSPITALIST PROGRESS NOTE
Hospitalist Progress Note


Date of Service


Mar 29, 2017.


 (Cher Rowley ., PA-C)





Subjective


Pt evaluation today including:  conversation w/ patient, physical exam, chart 

review, lab review, review of inpatient medication list


Pain:  None


PO Intake:  Tolerating clear liquids


Voiding:  no voiding problems


Patient reports feeling well.  He states that when he woke up this morning, he 

did have some mild abdominal pain as well as nausea.  After having a bowel 

movement earlier this morning, his symptoms resolved.  He is tolerating a clear 

liquid diet well.  At this point.  He denies any abdominal pain or nausea, 

weakness or fatigue.  The patient denies fevers, chills, sweats, chest pain, 

palpitations, claudication, cough, wheezing, shortness of breath, nausea, 

vomiting, abdominal pain, dysuria, hematuria, urinary retention, paralysis, 

weakness, numbness and tingling.





   Additional Comments:


See HPI for pertinent positives and negatives.  All other systems reviewed and 

negative.


 (Cher Rowley, PA-C)





Objective


Vital Signs











  Date Time  Temp Pulse Resp B/P Pulse Ox O2 Delivery O2 Flow Rate FiO2


 


3/29/17 07:30      Room Air  


 


3/29/17 07:02 36.8 59 16 99/63 98 Room Air  


 


3/28/17 23:38      Room Air  


 


3/28/17 23:01 36.5 67 14 100/63 98 Room Air  


 


3/28/17 15:30     96 Room Air  


 


3/28/17 14:54 36.7 98 18 117/75 96 Room Air  








 (Cher Rowley, PA-C)





Physical Exam


General Appearance:  WD/WN, no apparent distress, + thin


Eyes:  PERRL, EOMI, + pertinent finding (icterus improving)


ENT:  normal ENT inspection, hearing grossly normal, pharynx normal


Neck:  supple, no JVD, trachea midline


Respiratory/Chest:  lungs clear, normal breath sounds, no respiratory distress


Cardiovascular:  regular rate, rhythm, no gallop, no murmur


Abdomen:  normal bowel sounds, soft, + tenderness (mild diffuse abdominal 

tenderness with palpation only)


Extremities:  non-tender, normal inspection, no pedal edema


Neurologic/Psychiatric:  alert, normal mood/affect, oriented x 3


Skin:  normal color, warm/dry, no rash


 (Cher Rowley ., PA-C)





Laboratory Results





Last 24 Hours








Test


  3/29/17


05:10


 


White Blood Count 3.88 K/uL 


 


Red Blood Count 3.75 M/uL 


 


Hemoglobin 9.4 g/dL 


 


Hematocrit 26.0 % 


 


Mean Corpuscular Volume 69.3 fL 


 


Mean Corpuscular Hemoglobin 25.1 pg 


 


Mean Corpuscular Hemoglobin


Concent 36.2 g/dl 


 


 


Platelet Count 204 K/uL 


 


Mean Platelet Volume 10.7 fL 


 


Neutrophils (%) (Auto) 36.9 % 


 


Lymphocytes (%) (Auto) 39.4 % 


 


Monocytes (%) (Auto) 16.2 % 


 


Eosinophils (%) (Auto) 6.7 % 


 


Basophils (%) (Auto) 0.8 % 


 


Neutrophils # (Auto) 1.43 K/uL 


 


Lymphocytes # (Auto) 1.53 K/uL 


 


Monocytes # (Auto) 0.63 K/uL 


 


Eosinophils # (Auto) 0.26 K/uL 


 


Basophils # (Auto) 0.03 K/uL 


 


RDW Standard Deviation 38.8 fL 


 


RDW Coefficient of Variation 15.5 % 


 


Immature Granulocyte % (Auto) 0.0 % 


 


Immature Granulocyte # (Auto) 0.00 K/uL 


 


Large Platelets 3+ 


 


Microcytosis PRESENT 


 


Target Cells 2+ 


 


Sodium Level 142 mmol/L 


 


Potassium Level 3.6 mmol/L 


 


Chloride Level 105 mmol/L 


 


Carbon Dioxide Level 30 mmol/L 


 


Anion Gap 7.0 mmol/L 


 


Blood Urea Nitrogen 2 mg/dl 


 


Creatinine 0.50 mg/dl 


 


Est Creatinine Clear Calc


Drug Dose 163.0 ml/min 


 


 


Estimated GFR (


American) > 150.0 


 


 


Estimated GFR (Non-


American > 150.0 


 


 


BUN/Creatinine Ratio 3.2 


 


Random Glucose 146 mg/dl 


 


Calcium Level 8.7 mg/dl 


 


Magnesium Level 1.7 mg/dl 


 


Total Bilirubin 3.4 mg/dl 


 


Direct Bilirubin 1.3 mg/dl 


 


Aspartate Amino Transf


(AST/SGOT) 22 U/L 


 


 


Alanine Aminotransferase


(ALT/SGPT) 49 U/L 


 


 


Alkaline Phosphatase 58 U/L 


 


Total Protein 6.8 gm/dl 


 


Albumin 3.2 gm/dl 


 


Lipase 422 U/L 








 (Cher Rowley PA-C)





Assessment and Plan


22 y/o male with a history of sickle cell anemia presents to the ED on 3/25 

with abdominal pain, nausea, and vomiting.





Acute gallstone pancreatitis/acute cholecystitis--improving.  Pt met sepsis 

criteria on admission with tachycardia, WBC >12K and source of infection


-Admit to [facility] [place]


-Hypoglycemia resolved,  on 3/29.  IV fluids changed to Lactated Ringer'

s at 100 cc/hr


-GI consulted, appreciate recs:  Start clear liquid diet.  Continue IVF at 250 

cc/hr until 3/28.  GI signed off.


-Consult general surgery for cholecystectomy evaluation, appreciate recs:  

Planning for laparoscopic cholecystectomy with cholangiogram tomorrow, 3/30 at 

13:30


-Continue IV Zosyn


-Patient tolerating clear liquid diet well


-Dilaudid changed to morphine per patient request


-Continue nausea control with IV Zofran and Phenergan


-Continue Protonix 40 mg IV qd


-LFTs continued to improve.  Total bilirubin 3.4 on 3/29, improved from 4.3.  

AST/ALT WNL.


-Lipase continues to improve.  Lipase 422 on 3/29, improved from 595





Hypomagnesemia--stable


-Magnesium 1.4 on 3/27


-Given magnesium sulfate 1 g IV 1


-Magnesium 1.7 on 3/28


-Second bag of magnesium sulfate 1 g IV ordered, however, patient reported 

burning during infusion and did not complete the bag.  Patient was given oral 

supplementation instead.


-Magnesium remains at 1.7 on 3/29


-Continue oral supplementation with magnesium oxide 400 mg PO TID





Sickle cell anemia--stable


-Hgb stable at 9.4 on 3/29


-Hematology consulted, appreciate recs:  hold hydroxyurea until surgery





DVT prophylaxis


-SCDs





Code Status


-Level I, FULL RESUSCITATION STATUS








This chart was completed in part utilizing Dragon Speech Voice Recognition 

software. Attempts were made to minimize the grammatical errors, random word 

insertions, pronoun errors and incomplete sentences. Any formal questions or 

concerns about the content, text or information contained within the body of 

this dictation should be directly addressed to the provider for clarification.


 (Cher Rowley ., [place])


Attending Attestation:


Pt seen/examined, chart reviewed, care plan d/w LAMONTE Rowley.


I agree w/ the key components of her documentation.  





Pt with no abd pain/nausea/emesis at time of my visit. 


Had loose bowel movement earlier today.


No sob. 





VSS


no fever


gen - nad


eyes - mild icterus


heart - RRR


lungs - CTA b/l 


abd - soft, NT, ND, BS+, no HSM


ext - no edema





LFTs improving


lipase stable


Hb mid 9's


Cr normal





A/P:


1.  gallstone pancreatitis - resolved


2.  acute cholecystitis - cont zosyn, to OR In am for lap marisabel; NPO after MN


3.  FEN - cut fluid rate to 100cc/hr.  NPO after MN; replace magnesium. 


4.  Sickle cell anemia/variant - H/H stable.  No indication for transfusion at 

this time. 





Jhon Riley MD


 (Jhon Riley MD)

## 2017-03-29 NOTE — SURGERY PROGRESS NOTE
Surgery Progress Note


Date of Service


Mar 29, 2017.





Subjective


No nausea, No vomiting


Very little pain





Objective


Vital Signs:











  Date Time  Temp Pulse Resp B/P Pulse Ox O2 Delivery O2 Flow Rate FiO2


 


3/28/17 23:38      Room Air  


 


3/28/17 23:01 36.5 67 14 100/63 98 Room Air  


 


3/28/17 15:30     96 Room Air  


 


3/28/17 14:54 36.7 98 18 117/75 96 Room Air  


 


3/28/17 08:15      Room Air  


 


3/28/17 07:55 36.8 74 16 111/74 93 Room Air  








Abdomen:  normal bowel sounds, non distended, soft, + tenderness (minimal)


Laboratory Results:





Results Past 24 Hours








Test


  3/28/17


06:37 3/29/17


05:10 Range/Units


 


 


White Blood Count 6.12 3.88 4.8-10.8  K/uL


 


Red Blood Count 3.68 3.75 4.7-6.1  M/uL


 


Hemoglobin 9.3 9.4 14.0-18.0  g/dL


 


Hematocrit 25.6 26.0 42-52  %


 


Mean Corpuscular Volume 69.6 69.3   fL


 


Mean Corpuscular Hemoglobin 25.3 25.1 25-34  pg


 


Mean Corpuscular Hemoglobin


Concent 36.3


  36.2


  32-36  g/dl


 


 


Platelet Count 175 204 130-400  K/uL


 


Neutrophils (%) (Auto) 50.5 36.9  %


 


Lymphocytes (%) (Auto) 29.9 39.4  %


 


Monocytes (%) (Auto) 14.5 16.2  %


 


Eosinophils (%) (Auto) 4.4 6.7  %


 


Basophils (%) (Auto) 0.5 0.8  %


 


Neutrophils # (Auto) 3.09 1.43 1.4-6.5  K/uL


 


Lymphocytes # (Auto) 1.83 1.53 1.2-3.4  K/uL


 


Monocytes # (Auto) 0.89 0.63 0.11-0.59  K/uL


 


Eosinophils # (Auto) 0.27 0.26 0-0.5  K/uL


 


Basophils # (Auto) 0.03 0.03 0-0.2  K/uL


 


RDW Standard Deviation 39.1 38.8 36.4-46.3  fL


 


RDW Coefficient of Variation 15.6 15.5 11.5-14.5  %


 


Immature Granulocyte % (Auto) 0.2 0.0  %


 


Immature Granulocyte # (Auto) 0.01 0.00 0.00-0.02  K/uL


 


Large Platelets 1+   


 


Polychromasia 1+   


 


Microcytosis PRESENT   


 


Target Cells 2+   


 


Prothrombin Time


  12.0


  


  9.0-12.0


SECONDS


 


Prothromb Time International


Ratio 1.1


  


  0.9-1.1  


 


 


Activated Partial


Thromboplast Time 33.9


  


  21.0-31.0


SECONDS


 


Partial Thromboplastin Ratio 1.3   


 


Sodium Level 139  136-145  mmol/L


 


Potassium Level 3.7  3.5-5.1  mmol/L


 


Chloride Level 101    mmol/L


 


Carbon Dioxide Level 32  21-32  mmol/L


 


Anion Gap 6.0  3-11  mmol/L


 


Blood Urea Nitrogen 4  7-18  mg/dl


 


Creatinine


  0.49


  


  0.60-1.40


mg/dl


 


Est Creatinine Clear Calc


Drug Dose 166.3


  


   ml/min


 


 


Estimated GFR (


American) > 150.0


  


   


 


 


Estimated GFR (Non-


American > 150.0


  


   


 


 


BUN/Creatinine Ratio 7.1  10-20  


 


Random Glucose 64  70-99  mg/dl


 


Calcium Level 8.6  8.5-10.1  mg/dl


 


Magnesium Level 1.7  1.8-2.4  mg/dl


 


Total Bilirubin 4.3  0.2-1  mg/dl


 


Direct Bilirubin 1.6  0-0.2  mg/dl


 


Aspartate Amino Transf


(AST/SGOT) 28


  


  15-37  U/L


 


 


Alanine Aminotransferase


(ALT/SGPT) 57


  


  12-78  U/L


 


 


Alkaline Phosphatase 62    U/L


 


Total Protein 6.6  6.4-8.2  gm/dl


 


Albumin 3.3  3.4-5.0  gm/dl


 


Amylase Level 89    U/L


 


Lipase 595    U/L


 


Mean Platelet Volume  10.7 7.4-10.4  fL











Assessment & Plan


Pancreatitis secondary to gallstones


Pancreatitis resolving


Today's labs pending


Planning laparoscopic cholecystectomy with cholangiogram tomorrow


Yesterday, I discussed the procedure with the patient. I discussed the possible 

need to convert to an open procedure and the possible complications. He has 

signed a consent form.

## 2017-03-29 NOTE — ANESTHESIOLOGY PROGRESS NOTE
Pre-OP Anesthesia Assessment


Date of Note


Mar 29, 2017.





Review


patient information reviewed, chart reviewed, labs reviewed, acceptable for 

surgery





Notes


The patients only medical problem is sickle cell anemia. He had a painful 

crisis in January of this year and has had no serious complications yet. 

Otherwise he is healthy and denies all other medical problems. He is thin and 

has an airway that is between a MP 2-3. He is acceptable for anesthesia and 

surgery tomorrow.

## 2017-03-30 VITALS
OXYGEN SATURATION: 96 % | HEART RATE: 73 BPM | SYSTOLIC BLOOD PRESSURE: 109 MMHG | DIASTOLIC BLOOD PRESSURE: 66 MMHG | TEMPERATURE: 97.7 F

## 2017-03-30 VITALS
HEART RATE: 92 BPM | DIASTOLIC BLOOD PRESSURE: 82 MMHG | SYSTOLIC BLOOD PRESSURE: 144 MMHG | TEMPERATURE: 97.88 F | OXYGEN SATURATION: 100 %

## 2017-03-30 VITALS
SYSTOLIC BLOOD PRESSURE: 119 MMHG | DIASTOLIC BLOOD PRESSURE: 78 MMHG | HEART RATE: 60 BPM | OXYGEN SATURATION: 100 % | TEMPERATURE: 98.06 F

## 2017-03-30 VITALS — TEMPERATURE: 97.88 F | SYSTOLIC BLOOD PRESSURE: 125 MMHG | DIASTOLIC BLOOD PRESSURE: 78 MMHG | OXYGEN SATURATION: 100 %

## 2017-03-30 VITALS — DIASTOLIC BLOOD PRESSURE: 85 MMHG | HEART RATE: 92 BPM | SYSTOLIC BLOOD PRESSURE: 141 MMHG | OXYGEN SATURATION: 100 %

## 2017-03-30 VITALS
TEMPERATURE: 97.52 F | HEART RATE: 70 BPM | OXYGEN SATURATION: 100 % | DIASTOLIC BLOOD PRESSURE: 63 MMHG | SYSTOLIC BLOOD PRESSURE: 110 MMHG

## 2017-03-30 VITALS
TEMPERATURE: 97.52 F | DIASTOLIC BLOOD PRESSURE: 83 MMHG | HEART RATE: 52 BPM | SYSTOLIC BLOOD PRESSURE: 139 MMHG | OXYGEN SATURATION: 100 %

## 2017-03-30 VITALS
SYSTOLIC BLOOD PRESSURE: 136 MMHG | TEMPERATURE: 97.88 F | OXYGEN SATURATION: 100 % | HEART RATE: 90 BPM | DIASTOLIC BLOOD PRESSURE: 78 MMHG

## 2017-03-30 VITALS — OXYGEN SATURATION: 100 % | HEART RATE: 52 BPM

## 2017-03-30 LAB
ALP SERPL-CCNC: 57 U/L (ref 45–117)
ALT SERPL-CCNC: 47 U/L (ref 12–78)
ANION GAP SERPL CALC-SCNC: 5 MMOL/L (ref 3–11)
AST SERPL-CCNC: 19 U/L (ref 15–37)
BASOPHILS # BLD: 0.03 K/UL (ref 0–0.2)
BASOPHILS NFR BLD: 0.8 %
BUN SERPL-MCNC: 3 MG/DL (ref 7–18)
BUN/CREAT SERPL: 5.3 (ref 10–20)
CALCIUM SERPL-MCNC: 8.8 MG/DL (ref 8.5–10.1)
CHLORIDE SERPL-SCNC: 106 MMOL/L (ref 98–107)
CO2 SERPL-SCNC: 30 MMOL/L (ref 21–32)
COMPLETE: YES
CREAT CL PREDICTED SERPL C-G-VRATE: 148.2 ML/MIN
CREAT SERPL-MCNC: 0.55 MG/DL (ref 0.6–1.4)
EOSINOPHIL NFR BLD AUTO: 242 K/UL (ref 130–400)
GLUCOSE SERPL-MCNC: 83 MG/DL (ref 70–99)
HCT VFR BLD CALC: 28.2 % (ref 42–52)
IG%: 0 %
IMM GRANULOCYTES NFR BLD AUTO: 40.8 %
LYMPHOCYTES # BLD: 1.52 K/UL (ref 1.2–3.4)
MCH RBC QN AUTO: 25 PG (ref 25–34)
MCHC RBC AUTO-ENTMCNC: 35.5 G/DL (ref 32–36)
MCV RBC AUTO: 70.5 FL (ref 80–100)
MONOCYTES NFR BLD: 18.5 %
NEUTROPHILS # BLD AUTO: 6.4 %
NEUTROPHILS NFR BLD AUTO: 33.5 %
PMV BLD AUTO: 10.9 FL (ref 7.4–10.4)
POTASSIUM SERPL-SCNC: 3.8 MMOL/L (ref 3.5–5.1)
RBC # BLD AUTO: 4 M/UL (ref 4.7–6.1)
SODIUM SERPL-SCNC: 141 MMOL/L (ref 136–145)
WBC # BLD AUTO: 3.73 K/UL (ref 4.8–10.8)

## 2017-03-30 PROCEDURE — 0FT44ZZ RESECTION OF GALLBLADDER, PERCUTANEOUS ENDOSCOPIC APPROACH: ICD-10-PCS | Performed by: SURGERY

## 2017-03-30 RX ADMIN — Medication SCH MG: at 17:46

## 2017-03-30 RX ADMIN — PANTOPRAZOLE SODIUM SCH MLS/MIN: 40 INJECTION, POWDER, FOR SOLUTION INTRAVENOUS at 11:36

## 2017-03-30 RX ADMIN — Medication SCH MG: at 10:08

## 2017-03-30 RX ADMIN — SODIUM CHLORIDE, SODIUM LACTATE, POTASSIUM CHLORIDE, AND CALCIUM CHLORIDE SCH MLS/HR: 600; 310; 30; 20 INJECTION, SOLUTION INTRAVENOUS at 17:45

## 2017-03-30 RX ADMIN — PIPERACILLIN AND TAZOBACTAM SCH MLS/HR: 3; .375 INJECTION, POWDER, LYOPHILIZED, FOR SOLUTION INTRAVENOUS; PARENTERAL at 19:35

## 2017-03-30 RX ADMIN — FENTANYL CITRATE PRN MCG: 50 INJECTION, SOLUTION INTRAMUSCULAR; INTRAVENOUS at 15:51

## 2017-03-30 RX ADMIN — Medication SCH MG: at 21:53

## 2017-03-30 RX ADMIN — MORPHINE SULFATE PRN MG: 2 INJECTION, SOLUTION INTRAMUSCULAR; INTRAVENOUS at 17:46

## 2017-03-30 RX ADMIN — PIPERACILLIN AND TAZOBACTAM SCH MLS/HR: 3; .375 INJECTION, POWDER, LYOPHILIZED, FOR SOLUTION INTRAVENOUS; PARENTERAL at 12:57

## 2017-03-30 RX ADMIN — SODIUM CHLORIDE, SODIUM LACTATE, POTASSIUM CHLORIDE, AND CALCIUM CHLORIDE SCH MLS/HR: 600; 310; 30; 20 INJECTION, SOLUTION INTRAVENOUS at 07:33

## 2017-03-30 RX ADMIN — FENTANYL CITRATE PRN MCG: 50 INJECTION, SOLUTION INTRAMUSCULAR; INTRAVENOUS at 15:46

## 2017-03-30 RX ADMIN — PIPERACILLIN AND TAZOBACTAM SCH MLS/HR: 3; .375 INJECTION, POWDER, LYOPHILIZED, FOR SOLUTION INTRAVENOUS; PARENTERAL at 03:37

## 2017-03-30 NOTE — HOSPITALIST PROGRESS NOTE
Hospitalist Progress Note


Date of Service


Mar 30, 2017.


 (Cher Rowley, PA-C)





Subjective


Pt evaluation today including:  conversation w/ patient, physical exam, chart 

review, lab review, review of inpatient medication list


Pain:  None


PO Intake:  NPO for surgery today


Voiding:  no voiding problems


Patient reports feeling well.  He denies any abdominal pain, nausea, or 

vomiting.  He is currently NPO for his lap cholecystectomy later today.  The 

patient denies fevers, chills, sweats, chest pain, palpitations, claudication, 

cough, wheezing, shortness of breath, nausea, vomiting, abdominal pain, dysuria

, hematuria, urinary retention, paralysis, weakness, numbness and tingling.





   Additional Comments:


See HPI for pertinent positives and negatives.  All other systems reviewed and 

negative.


 (Cher Rowley PA-C)





Objective


Vital Signs











  Date Time  Temp Pulse Resp B/P Pulse Ox O2 Delivery O2 Flow Rate FiO2


 


3/30/17 08:11 36.4 70 19 110/63 100 Room Air  


 


3/30/17 07:35      Room Air  


 


3/30/17 00:04 36.5 73 16 109/66 96 Room Air  


 


3/29/17 23:15      Room Air  


 


3/29/17 20:00      Room Air  


 


3/29/17 16:50      Room Air  


 


3/29/17 14:57 36.7 102 16 113/71 96 Room Air  








 (Cher Rowley ., PA-C)





Physical Exam


General Appearance:  WD/WN, no apparent distress, + thin


Eyes:  normal inspection, PERRL, EOMI, + pertinent finding (icterus improving)


ENT:  normal ENT inspection, hearing grossly normal, pharynx normal


Neck:  supple, no JVD, trachea midline


Respiratory/Chest:  lungs clear, normal breath sounds, no respiratory distress


Cardiovascular:  regular rate, rhythm, no gallop, no murmur


Abdomen:  normal bowel sounds, soft, + tenderness (LLQ mildly TTP)


Extremities:  non-tender, normal inspection, no pedal edema


Neurologic/Psychiatric:  alert, normal mood/affect, oriented x 3


Skin:  normal color, warm/dry, no rash


 (Cher Rowley, PA-C)





Laboratory Results





Last 24 Hours








Test


  3/30/17


06:36


 


White Blood Count 3.73 K/uL 


 


Red Blood Count 4.00 M/uL 


 


Hemoglobin 10.0 g/dL 


 


Hematocrit 28.2 % 


 


Mean Corpuscular Volume 70.5 fL 


 


Mean Corpuscular Hemoglobin 25.0 pg 


 


Mean Corpuscular Hemoglobin


Concent 35.5 g/dl 


 


 


Platelet Count 242 K/uL 


 


Mean Platelet Volume 10.9 fL 


 


Neutrophils (%) (Auto) 33.5 % 


 


Lymphocytes (%) (Auto) 40.8 % 


 


Monocytes (%) (Auto) 18.5 % 


 


Eosinophils (%) (Auto) 6.4 % 


 


Basophils (%) (Auto) 0.8 % 


 


Neutrophils # (Auto) 1.25 K/uL 


 


Lymphocytes # (Auto) 1.52 K/uL 


 


Monocytes # (Auto) 0.69 K/uL 


 


Eosinophils # (Auto) 0.24 K/uL 


 


Basophils # (Auto) 0.03 K/uL 


 


RDW Standard Deviation 40.6 fL 


 


RDW Coefficient of Variation 15.7 % 


 


Immature Granulocyte % (Auto) 0.0 % 


 


Immature Granulocyte # (Auto) 0.00 K/uL 


 


Sodium Level 141 mmol/L 


 


Potassium Level 3.8 mmol/L 


 


Chloride Level 106 mmol/L 


 


Carbon Dioxide Level 30 mmol/L 


 


Anion Gap 5.0 mmol/L 


 


Blood Urea Nitrogen 3 mg/dl 


 


Creatinine 0.55 mg/dl 


 


Est Creatinine Clear Calc


Drug Dose 148.2 ml/min 


 


 


Estimated GFR (


American) > 150.0 


 


 


Estimated GFR (Non-


American > 150.0 


 


 


BUN/Creatinine Ratio 5.3 


 


Random Glucose 83 mg/dl 


 


Calcium Level 8.8 mg/dl 


 


Total Bilirubin 3.5 mg/dl 


 


Direct Bilirubin 1.4 mg/dl 


 


Aspartate Amino Transf


(AST/SGOT) 19 U/L 


 


 


Alanine Aminotransferase


(ALT/SGPT) 47 U/L 


 


 


Alkaline Phosphatase 57 U/L 


 


Total Protein 7.2 gm/dl 


 


Albumin 3.6 gm/dl 


 


Lipase 745 U/L 








 (Cher Rowley, PA-C)





Assessment and Plan


22 y/o male with a history of sickle cell anemia presents to the ED on 3/25 

with abdominal pain, nausea, and vomiting.





Acute gallstone pancreatitis/acute cholecystitis--improving.  Pt met sepsis 

criteria on admission with tachycardia, WBC >12K and source of infection


-Admit to [facility]


-Hypoglycemia resolved,  on 3/29.  IV fluids changed to Lactated Ringer'

s at 100 cc/hr


-GI consulted, appreciate recs:  Start clear liquid diet.  Continue IVF at 250 

cc/hr until 3/28.  GI signed off.


-Consult general surgery for cholecystectomy evaluation, appreciate recs:  

Planning for laparoscopic cholecystectomy with cholangiogram on 3/30 at 13:30


-NPO after midnight


-Continue IV Zosyn


-Patient tolerating clear liquid diet well


-Dilaudid changed to morphine per patient request


-Continue nausea control with IV Zofran and Phenergan


-Continue Protonix 40 mg IV qd


-LFTs stable, very minimal changed compared to 3/29


-Lipase increased to 745 on 3/30, up from 422.  Pt clinically doing well though 

and LFTs stable as above





Hypomagnesemia--resolved


-Magnesium 1.4 on 3/27


-Given magnesium sulfate 1 g IV 1


-Magnesium 1.7 on 3/28


-Second bag of magnesium sulfate 1 g IV ordered, however, patient reported 

burning during infusion and did not complete the bag.  Patient was given oral 

supplementation instead.


-Magnesium remains at 1.7 on 3/29


-Magnesium oxide 400 mg PO TID


-Repeat magnesium on 3/30 at 2.0





Sickle cell anemia--stable


-Hgb improved on 3/30 to 10.0


-Hematology consulted, appreciate recs:  hold hydroxyurea until surgery





DVT prophylaxis


-SCDs





Code Status


-Level I, FULL RESUSCITATION STATUS








This chart was completed in part utilizing Dragon Speech Voice Recognition 

software. Attempts were made to minimize the grammatical errors, random word 

insertions, pronoun errors and incomplete sentences. Any formal questions or 

concerns about the content, text or information contained within the body of 

this dictation should be directly addressed to the provider for clarification.


 (Cher Rowley ., PA-C)


Attending Attestation:


Pt seen/examined, chart reviewed, care plan d/w LAMONTE Rowley.


I agree w/ the key components of her documentation.  





I saw the patient post-op from lap marisabel.


He c/o abdominal pain but o/w no sob. 





VSS


no fever


gen - mild distress from abd pain


eyes - mild icterus


heart - RRR


lungs - CTA b/l 


abd - BS+, incisions clean, mild tenderness over incisions


ext - no edema





LFTs improving


lipase 700s


Hb 10





A/P:


1.  gallstone pancreatitis - resolved


2.  acute cholecystitis - s/p lap marisabel today.  can likely d/c abx.  no CBD 

stone seen on cholangiogram. 


3.  FEN - cont fluids at  100cc/hr.  Diet per surgery.  Mag is now normal.  


4.  Sickle cell anemia/variant - H/H stable.  No indication for transfusion at 

this time. 





Jhon Riley MD


 (Jhon Riley MD)

## 2017-03-30 NOTE — SURGERY PROGRESS NOTE
Surgery Progress Note


Date of Service


Mar 30, 2017.





Subjective


Post OP Day:  HD # 4


+ bowel movement, + diet (no diet as of this morning), + feeling well, + flatus

, + pain controlled, No SOB, No chest pain, No complaints, No nausea, No 

vomiting





Objective


Vital Signs:











  Date Time  Temp Pulse Resp B/P Pulse Ox O2 Delivery O2 Flow Rate FiO2


 


3/30/17 08:11 36.4 70 19 110/63 100 Room Air  


 


3/30/17 07:35      Room Air  


 


3/30/17 00:04 36.5 73 16 109/66 96 Room Air  


 


3/29/17 23:15      Room Air  


 


3/29/17 20:00      Room Air  


 


3/29/17 16:50      Room Air  


 


3/29/17 14:57 36.7 102 16 113/71 96 Room Air  








General Appearance:  WD/WN, no apparent distress


Head:  normocephalic, atraumatic, + pertinent finding (sclear slightly yellow)


Neck:  trachea midline


Respiratory/Chest:  lungs clear, normal breath sounds, no respiratory distress, 

no accessory muscle use


Cardiovascular:  regular rate, rhythm, no murmur


Abdomen:  non tender, non distended, soft, no organomegaly


Extremities:  normal inspection


Laboratory Results:





Results Past 24 Hours








Test


  3/30/17


06:36 Range/Units


 


 


White Blood Count 3.73 4.8-10.8  K/uL


 


Red Blood Count 4.00 4.7-6.1  M/uL


 


Hemoglobin 10.0 14.0-18.0  g/dL


 


Hematocrit 28.2 42-52  %


 


Mean Corpuscular Volume 70.5   fL


 


Mean Corpuscular Hemoglobin 25.0 25-34  pg


 


Mean Corpuscular Hemoglobin


Concent 35.5


  32-36  g/dl


 


 


Platelet Count 242 130-400  K/uL


 


Mean Platelet Volume 10.9 7.4-10.4  fL


 


Neutrophils (%) (Auto) 33.5  %


 


Lymphocytes (%) (Auto) 40.8  %


 


Monocytes (%) (Auto) 18.5  %


 


Eosinophils (%) (Auto) 6.4  %


 


Basophils (%) (Auto) 0.8  %


 


Neutrophils # (Auto) 1.25 1.4-6.5  K/uL


 


Lymphocytes # (Auto) 1.52 1.2-3.4  K/uL


 


Monocytes # (Auto) 0.69 0.11-0.59  K/uL


 


Eosinophils # (Auto) 0.24 0-0.5  K/uL


 


Basophils # (Auto) 0.03 0-0.2  K/uL


 


RDW Standard Deviation 40.6 36.4-46.3  fL


 


RDW Coefficient of Variation 15.7 11.5-14.5  %


 


Immature Granulocyte % (Auto) 0.0  %


 


Immature Granulocyte # (Auto) 0.00 0.00-0.02  K/uL


 


Sodium Level 141 136-145  mmol/L


 


Potassium Level 3.8 3.5-5.1  mmol/L


 


Chloride Level 106   mmol/L


 


Carbon Dioxide Level 30 21-32  mmol/L


 


Anion Gap 5.0 3-11  mmol/L


 


Blood Urea Nitrogen 3 7-18  mg/dl


 


Creatinine


  0.55


  0.60-1.40


mg/dl


 


Est Creatinine Clear Calc


Drug Dose 148.2


   ml/min


 


 


Estimated GFR (


American) > 150.0


   


 


 


Estimated GFR (Non-


American > 150.0


   


 


 


BUN/Creatinine Ratio 5.3 10-20  


 


Random Glucose 83 70-99  mg/dl


 


Calcium Level 8.8 8.5-10.1  mg/dl


 


Magnesium Level 2.0 1.8-2.4  mg/dl


 


Total Bilirubin 3.5 0.2-1  mg/dl


 


Direct Bilirubin 1.4 0-0.2  mg/dl


 


Aspartate Amino Transf


(AST/SGOT) 19


  15-37  U/L


 


 


Alanine Aminotransferase


(ALT/SGPT) 47


  12-78  U/L


 


 


Alkaline Phosphatase 57   U/L


 


Total Protein 7.2 6.4-8.2  gm/dl


 


Albumin 3.6 3.4-5.0  gm/dl


 


Lipase 745   U/L











Assessment & Plan


Acute Gallstone Pancreatitis


   - vital signs stable


   - Abdominal pain improving and minimal and controlled with Dilaudid


   - tolerating clear liquids


   - + bowel function


   - Total bilirubin 3.5 (slight increase from yesterday at 3.4), Direct bili- 

1.4 (slight increase from yesterday 1.3) , and Lipase 745 (increase from 

yesterday 422)








Plan:


Currently NPO for Laparoscopic cholecystectomy and cholangiogram today at 1:30 

pm. 


Continue current IV fluids, and pain management

## 2017-03-30 NOTE — [facility] POST OPERATIVE BRIEF NOTE
Immediate Operative Summary


Operative Date


Mar 30, 2017.





Pre-Operative Diagnosis





acute gallstone pancreatitis





Post-Operative Diagnosis





acute gallstone pancreatitis





Procedure(s) Performed





Laparoscopic Cholecystectomy with Cholangiogram





Surgeon


Dr. Rustam Cleary





Assistant Surgeon(s)


Airam Avila PA-C





Estimated Blood Loss


5cc





Findings


See dictation





Specimens





A. gallbladder





Drains


None





Anesthesia


General





Complication(s)


None





Disposition


Recovery Room / PACU

## 2017-03-30 NOTE — DIAGNOSTIC IMAGING REPORT
INTRAOPERATIVE CHOLANGIOGRAM 



HISTORY: Post cholecystectomy.



FLUOROSCOPY TIME: 23 seconds.



FINDINGS: Fluoroscopy was provided for an intraoperative cholangiogram status

post cholecystectomy. Contrast was injected through the cystic duct remnant. The

common bile duct is normal in course and caliber. There are no filling defects

seen within the common bile duct to suggest a retained stone.  Contrast extends

into the small bowel. There is no intrahepatic bile duct dilatation.



IMPRESSION: Fluoroscopy provided for an intraoperative cholangiogram status post

cholecystectomy. No filling defects within the common bile duct. Good flow

contrast to the duodenum.







Electronically signed by:  Rustam Alonzo M.D.

3/30/2017 2:55 PM



Dictated Date/Time:  3/30/2017 2:54 PM

## 2017-03-30 NOTE — ANESTHESIOLOGY PROGRESS NOTE
Anesthesia Post Op Note


Date & Time


Mar 30, 2017 at 16:10





Vital Signs


Pain Intensity:  3





 Vital Signs Past 12 Hours








  Date Time  Temp Pulse Resp B/P Pulse Ox O2 Delivery O2 Flow Rate FiO2


 


3/30/17 16:00  64 16 153/90 100 Nasal Cannula 3 


 


3/30/17 15:50  58 16 158/95 100 Mask 10 


 


3/30/17 15:40  67 16 154/91 100 Mask 10 


 


3/30/17 15:33 36.6 76 16 133/81 100 Mask 10 


 


3/30/17 08:11 36.4 70 19 110/63 100 Room Air  


 


3/30/17 07:35      Room Air  











Notes


Mental Status:  alert / awake / arousable, participated in evaluation


Pt Amnestic to Procedure:  Yes


Nausea / Vomiting:  adequately controlled


Pain:  adequately controlled


Airway Patency, RR, SpO2:  stable & adequate


BP & HR:  stable & adequate


Hydration State:  stable & adequate


Anesthetic Complications:  no major complications apparent

## 2017-03-31 VITALS — OXYGEN SATURATION: 100 % | HEART RATE: 55 BPM

## 2017-03-31 VITALS
OXYGEN SATURATION: 100 % | DIASTOLIC BLOOD PRESSURE: 57 MMHG | TEMPERATURE: 97.7 F | HEART RATE: 53 BPM | SYSTOLIC BLOOD PRESSURE: 99 MMHG

## 2017-03-31 VITALS
OXYGEN SATURATION: 99 % | SYSTOLIC BLOOD PRESSURE: 124 MMHG | HEART RATE: 58 BPM | DIASTOLIC BLOOD PRESSURE: 76 MMHG | TEMPERATURE: 97.88 F

## 2017-03-31 VITALS
OXYGEN SATURATION: 99 % | TEMPERATURE: 97.88 F | HEART RATE: 51 BPM | DIASTOLIC BLOOD PRESSURE: 51 MMHG | SYSTOLIC BLOOD PRESSURE: 102 MMHG

## 2017-03-31 VITALS
DIASTOLIC BLOOD PRESSURE: 76 MMHG | SYSTOLIC BLOOD PRESSURE: 124 MMHG | OXYGEN SATURATION: 99 % | HEART RATE: 58 BPM | TEMPERATURE: 97.88 F

## 2017-03-31 VITALS — HEART RATE: 60 BPM

## 2017-03-31 LAB
ALP SERPL-CCNC: 53 U/L (ref 45–117)
ALT SERPL-CCNC: 43 U/L (ref 12–78)
ANION GAP SERPL CALC-SCNC: 8 MMOL/L (ref 3–11)
AST SERPL-CCNC: 28 U/L (ref 15–37)
BASOPHILS # BLD: 0.01 K/UL (ref 0–0.2)
BASOPHILS NFR BLD: 0.2 %
BUN SERPL-MCNC: 5 MG/DL (ref 7–18)
BUN/CREAT SERPL: 9.3 (ref 10–20)
CALCIUM SERPL-MCNC: 8.5 MG/DL (ref 8.5–10.1)
CHLORIDE SERPL-SCNC: 103 MMOL/L (ref 98–107)
CO2 SERPL-SCNC: 27 MMOL/L (ref 21–32)
COMPLETE: YES
CREAT CL PREDICTED SERPL C-G-VRATE: 143 ML/MIN
CREAT SERPL-MCNC: 0.57 MG/DL (ref 0.6–1.4)
EOSINOPHIL NFR BLD AUTO: 228 K/UL (ref 130–400)
ERYTHROCYTE [DISTWIDTH] IN BLOOD BY AUTOMATED COUNT: 16.3 % (ref 11–15)
GLUCOSE SERPL-MCNC: 102 MG/DL (ref 70–99)
HCT VFR BLD AUTO: 31.2 % (ref 38.5–50)
HCT VFR BLD CALC: 26.3 % (ref 42–52)
HGB A2 MFR BLD COLUMN CHROM: 1.8 % (ref 1.8–3.5)
HGB BLD-MCNC: 10.3 G/DL (ref 13.2–17.1)
HGB F MFR BLD: 24.3 % (ref ?–2)
HGB S MFR BLD: 71.2 %
IG%: 0.2 %
IMM GRANULOCYTES NFR BLD AUTO: 28.6 %
LYMPHOCYTES # BLD: 1.64 K/UL (ref 1.2–3.4)
MAGNESIUM SERPL-MCNC: 1.9 MG/DL (ref 1.8–2.4)
MCH RBC QN AUTO: 25.1 PG (ref 25–34)
MCH RBC QN AUTO: 25.8 PG (ref 27–33)
MCHC RBC AUTO-ENTMCNC: 36.1 G/DL (ref 32–36)
MCV RBC AUTO: 69.6 FL (ref 80–100)
MCV RBC: 78 FL (ref 80–100)
MICROCYTES BLD QL SMEAR: PRESENT
MONOCYTES NFR BLD: 14.1 %
NEUTROPHILS # BLD AUTO: 0.3 %
NEUTROPHILS NFR BLD AUTO: 56.6 %
POTASSIUM SERPL-SCNC: 4 MMOL/L (ref 3.5–5.1)
RBC # BLD AUTO: 3.78 M/UL (ref 4.7–6.1)
RBC # BLD AUTO: 4 MILL/UL (ref 4.2–5.8)
SODIUM SERPL-SCNC: 138 MMOL/L (ref 136–145)
TARGETS BLD QL SMEAR: (no result)
WBC # BLD AUTO: 5.73 K/UL (ref 4.8–10.8)

## 2017-03-31 RX ADMIN — PIPERACILLIN AND TAZOBACTAM SCH MLS/HR: 3; .375 INJECTION, POWDER, LYOPHILIZED, FOR SOLUTION INTRAVENOUS; PARENTERAL at 03:44

## 2017-03-31 RX ADMIN — Medication SCH MG: at 13:25

## 2017-03-31 RX ADMIN — MORPHINE SULFATE PRN MG: 2 INJECTION, SOLUTION INTRAMUSCULAR; INTRAVENOUS at 00:09

## 2017-03-31 RX ADMIN — SODIUM CHLORIDE, SODIUM LACTATE, POTASSIUM CHLORIDE, AND CALCIUM CHLORIDE SCH MLS/HR: 600; 310; 30; 20 INJECTION, SOLUTION INTRAVENOUS at 13:24

## 2017-03-31 RX ADMIN — SODIUM CHLORIDE, SODIUM LACTATE, POTASSIUM CHLORIDE, AND CALCIUM CHLORIDE SCH MLS/HR: 600; 310; 30; 20 INJECTION, SOLUTION INTRAVENOUS at 03:44

## 2017-03-31 RX ADMIN — PANTOPRAZOLE SODIUM SCH MLS/MIN: 40 INJECTION, POWDER, FOR SOLUTION INTRAVENOUS at 08:55

## 2017-03-31 RX ADMIN — Medication SCH MG: at 08:54

## 2017-03-31 NOTE — HEME/ONC PROGRESS NOTE
DATE: 03/31/2017

 

DATE:  03/31/2017.

 

DIAGNOSES:

1. Status post laparoscopic cholecystectomy.

2. Pancreatitis.

3. Cholecystitis.

4. Hemoglobin S with persistent fetal hemoglobin.

 

HOSPITAL COURSE:  Mr. Ariza is a 21-year-old [employer] student well

known to [facility] who suffers from the above hemoglobinopathy

followed by Dr. Diaz.  He underwent laparoscopic cholecystectomy

yesterday.  Today has little postoperative discomfort and has not yet

commenced with a follow-up diet.  Counts today reveal hemoglobin of 9.5 and

hematocrit 26.3 respectively.  Nursing reports no overnight issues.

 

PHYSICAL EXAMINATION:

GENERAL:  He is in no acute distress.

VITAL SIGNS:  Temperature 36.5, pulse 53, respiration 16, blood pressure

99/57.

SKIN:  Without rash or lesion.

HEAD, EYES, EARS, NOSE, AND THROAT:  Oral mucosa without erythema or

ulceration.

NECK:  Supple.

HEART:  Regular rate and rhythm.

LUNGS:  Clear to auscultation.

ABDOMEN:  Steri-Strips covering laparoscopic surgical wounds.  Abdomen is

otherwise firm, nondistended.  No rigidity or guarding.

EXTREMITIES:  No clubbing, cyanosis or edema.

NEUROLOGIC:  Grossly intact.

 

LABORATORY DATA:  WBC count 5,730, hemoglobin 9.5, platelet count 228,000. 

Sodium 138, potassium 4.0, chloride 103, carbon dioxide 27, creatinine 0.57,

BUN 5.

 

IMPRESSION:

1. Postoperative day 1 status post laparoscopic cholecystectomy.

2. Acute pancreatitis.

3. Cholecystitis/choledocholithiasis.

4. Hemoglobin S with persistent fetal hemoglobin.

 

PLAN:  Mr. Ariza seems to be doing quite well postoperatively.  He is

anxious to resume diet.  From a hematologic standpoint, his counts are stable

and transfusional support not necessary at this juncture.  We will make sure

the patient has appropriate outpatient hematologic followup.  I have nothing

further to add at this juncture.

 

Thank you again for allowing us to participate in the care of this very

pleasant gentleman.

## 2017-03-31 NOTE — ANESTHESIOLOGY PROGRESS NOTE
Anesthesia Post Op Note


Date & Time


Mar 31, 2017 at 08:30





Vital Signs





 Vital Signs Past 12 Hours








  Date Time  Temp Pulse Resp B/P Pulse Ox O2 Delivery O2 Flow Rate FiO2


 


3/31/17 06:57 36.5 53 16 99/57 100 Nasal Cannula 2.0 


 


3/31/17 03:05 36.6 51 16 102/51 99 Nasal Cannula 2.0 


 


3/31/17 01:00  60      


 


3/31/17 00:05  55   100 Room Air 1.0 


 


3/30/17 23:30 36.6  16 125/78 100 Nasal Cannula 3.0 


 


3/30/17 23:24  52   100 Nasal Cannula 2.0 











Notes


Mental Status:  alert / awake / arousable, participated in evaluation


Pt Amnestic to Procedure:  Yes


Nausea / Vomiting:  adequately controlled


Pain:  adequately controlled


Airway Patency, RR, SpO2:  stable & adequate


BP & HR:  stable & adequate


Hydration State:  stable & adequate


Anesthetic Complications:  no major complications apparent

## 2017-03-31 NOTE — SURGERY PROGRESS NOTE
Surgery Progress Note


Date of Service


Mar 31, 2017.





Subjective


Post OP Day:  1


+ feeling well, + flatus, No SOB, No chest pain, No complaints, No nausea, No 

vomiting





Objective


Vital Signs:











  Date Time  Temp Pulse Resp B/P Pulse Ox O2 Delivery O2 Flow Rate FiO2


 


3/31/17 06:57 36.5 53 16 99/57 100 Nasal Cannula 2.0 


 


3/31/17 03:05 36.6 51 16 102/51 99 Nasal Cannula 2.0 


 


3/31/17 01:00  60      


 


3/31/17 00:05  55   100 Room Air 1.0 


 


3/30/17 23:30 36.6  16 125/78 100 Nasal Cannula 3.0 


 


3/30/17 23:24  52   100 Nasal Cannula 2.0 


 


3/30/17 19:30      Nasal Cannula 3.0 


 


3/30/17 19:15 36.7 60 18 119/78 100 Nasal Cannula 3.0 


 


3/30/17 18:15 36.4 52 16 139/83 100 Nasal Cannula 3.0 


 


3/30/17 17:15 36.6 92 16 144/82 100 Nasal Cannula 3.0 


 


3/30/17 16:53  92 16 141/85 100 Nasal Cannula 3.0 


 


3/30/17 16:38      Nasal Cannula  


 


3/30/17 16:20 36.6 90 14 136/78 100  3.0 


 


3/30/17 16:20     92 Nasal Cannula 3.0 


 


3/30/17 16:10 36.3 60 16 158/84 100 Nasal Cannula 3 


 


3/30/17 16:00  64 16 153/90 100 Nasal Cannula 3 


 


3/30/17 15:50  58 16 158/95 100 Mask 10 


 


3/30/17 15:40  67 16 154/91 100 Mask 10 


 


3/30/17 15:33 36.6 76 16 133/81 100 Mask 10 








General Appearance:  WD/WN, no apparent distress


Head:  normocephalic, atraumatic


Neck:  trachea midline


Respiratory/Chest:  lungs clear, normal breath sounds, no respiratory distress, 

no accessory muscle use


Cardiovascular:  regular rate, rhythm, no murmur


Abdomen:  non distended, soft, + tenderness (appropriate post op)


Incision(s):  clean, dry, intact


Laboratory Results:





Results Past 24 Hours








Test


  3/31/17


05:55 Range/Units


 


 


White Blood Count 5.73 4.8-10.8  K/uL


 


Red Blood Count 3.78 4.7-6.1  M/uL


 


Hemoglobin 9.5 14.0-18.0  g/dL


 


Hematocrit 26.3 42-52  %


 


Mean Corpuscular Volume 69.6   fL


 


Mean Corpuscular Hemoglobin 25.1 25-34  pg


 


Mean Corpuscular Hemoglobin


Concent 36.1


  32-36  g/dl


 


 


Platelet Count 228 130-400  K/uL


 


Neutrophils (%) (Auto) 56.6  %


 


Lymphocytes (%) (Auto) 28.6  %


 


Monocytes (%) (Auto) 14.1  %


 


Eosinophils (%) (Auto) 0.3  %


 


Basophils (%) (Auto) 0.2  %


 


Neutrophils # (Auto) 3.24 1.4-6.5  K/uL


 


Lymphocytes # (Auto) 1.64 1.2-3.4  K/uL


 


Monocytes # (Auto) 0.81 0.11-0.59  K/uL


 


Eosinophils # (Auto) 0.02 0-0.5  K/uL


 


Basophils # (Auto) 0.01 0-0.2  K/uL


 


RDW Standard Deviation 39.3 36.4-46.3  fL


 


RDW Coefficient of Variation 15.6 11.5-14.5  %


 


Immature Granulocyte % (Auto) 0.2  %


 


Immature Granulocyte # (Auto) 0.01 0.00-0.02  K/uL


 


Microcytosis PRESENT  


 


Target Cells 2+  


 


Sodium Level 138 136-145  mmol/L


 


Potassium Level 4.0 3.5-5.1  mmol/L


 


Chloride Level 103   mmol/L


 


Carbon Dioxide Level 27 21-32  mmol/L


 


Anion Gap 8.0 3-11  mmol/L


 


Blood Urea Nitrogen 5 7-18  mg/dl


 


Creatinine


  0.57


  0.60-1.40


mg/dl


 


Est Creatinine Clear Calc


Drug Dose 143.0


   ml/min


 


 


Estimated GFR (


American) > 150.0


   


 


 


Estimated GFR (Non-


American 148.9


   


 


 


BUN/Creatinine Ratio 9.3 10-20  


 


Random Glucose 102 70-99  mg/dl


 


Calcium Level 8.5 8.5-10.1  mg/dl


 


Magnesium Level 1.9 1.8-2.4  mg/dl


 


Total Bilirubin 2.9 0.2-1  mg/dl


 


Direct Bilirubin 1.1 0-0.2  mg/dl


 


Aspartate Amino Transf


(AST/SGOT) 28


  15-37  U/L


 


 


Alanine Aminotransferase


(ALT/SGPT) 43


  12-78  U/L


 


 


Alkaline Phosphatase 53   U/L


 


Total Protein 6.5 6.4-8.2  gm/dl


 


Albumin 3.2 3.4-5.0  gm/dl


 


Lipase 491   U/L











Assessment & Plan


POD # 1 s/p laparoscopic cholecystectomy with intraoperative Cholangiogram


   -vitals stable


   - pain minimal controlled, appropriate post-op on exam


   - Total bilirubin down to 2.9 and direct to 1.1, Lipase down to 451


   - tolerating clear liquids





Plan:


Would advance diet to low fat diet to see if he tolerates before discharge


From surgical standpoint good for discharge


Should follow-up in 2 weeks with Dr. Evin Cleary has seen and examined patient, agrees with above stated findings 

and treatment plan.

## 2017-03-31 NOTE — OPERATIVE REPORT
DATE OF OPERATION:  03/30/2017

 

PREOPERATIVE DIAGNOSIS:  Cholelithiasis, gallbladder sludge with history of

pancreatitis.

 

POSTOPERATIVE DIAGNOSIS:  Same.

 

PROCEDURE:  Laparoscopic cholecystectomy with intraoperative cholangiogram.

 

SURGEON:  Dr. Cleary.

 

ASSISTANT:  Gail Avila PA-C.

 

FINDINGS:  The gallbladder was of normal size.  The cystic duct was not

dilated.  The liver was of normal size and contour.  The visible bowel

appeared normal.  The cholangiogram was performed.  There was free flow into

the duodenum.  There was flow into the radicles.  There was no evidence of

filling defect.

 

TECHNIQUE:  The patient was given a general anesthetic and the area was

prepped and draped in the usual sterile fashion.  Transverse incision was

made below the umbilicus, carried down through the subcutaneous tissue to the

fascia which was grasped with 2 Kocher clamps and incised between.  The

peritoneum was identified, incised, and the introducer was placed bluntly. 

The abdomen was then insufflated to a pressure of 15 mmHg with carbon

dioxide.  The upper midline, mid clavicular, and anterior axillary

introducers were placed under direct vision through small skin incisions. 

Traction was placed on the gallbladder, and beginning on the lateral side of

the infundibulum, the peritoneum was opened and peeled down toward the common

bile duct.  The infundibulum was dissected away from the liver on that side. 

Further dissection was carried similarly on the anterior surface of the

infundibulum and into the triangle of Calot which was opened.  The

infundibulum was dissected away from the infundibulum on the medial side. 

Further dissection of connective tissue and lymphatics were peeled toward the

common bile duct and the cystic duct was identified.  A plane was able to be

established behind the cystic duct by performing further dissection.  The

cystic duct-gallbladder junction was confirmed.  A clip was placed near the

cystic duct-gallbladder junction and the cystic duct was partially

transected.  The cholangiocatheter was placed in the duct and the balloon was

inflated.  It was irrigated, there was no leak.  Cholangiogram was performed

with findings as above.  Cholangiocatheter was removed and 3 clips were

placed in the proximal cystic duct and transection was completed.  That

allowed me to visualize easily the cystic artery, isolated, clamped twice

proximally and once near the gallbladder and divided.  The gallbladder was

peeled off the liver bed, and in doing so, there was a posterior branch of

the artery that was identified in 2 places.  It was clamped, divided, and the

division of the attachments of the gallbladder to the liver were taken down

and the gallbladder was freed.  It was placed into an Endobag and brought out

through the upper midline incision.  The introducer was replaced and the

liver edge was elevated.  The subdiaphragmatic and subhepatic spaces were

irrigated and the irrigation removed and that was repeated until the return

was clear.  The gallbladder bed of the liver was inspected and there was no

bleeding.  The previously placed clips were inspected and were intact.  The

gas was allowed to escape and the introducers were removed.  The fascia of

the umbilical introducer site and the upper midline introducer sites were

closed with interrupted 0 Vicryl and skin of all the incisions was closed

with 4-0 Monocryl in either an interrupted or running subcuticular fashion. 

The skin was cleansed, dried, benzoin placed and Steri-Strips applied.  The

estimated blood loss was 5 mL.  Sponge, needle and instrument counts were

correct prior to closure.  The patient tolerated surgical procedure without

complication and was transferred to recovery.

 

 

I attest to the content of the Intraoperative Record and any orders documented therein. Any exceptio
ns are noted below.

## 2017-03-31 NOTE — DISCHARGE SUMMARY
Discharge Summary


Date of Service


Mar 31, 2017.


 (Cher Rowley PA-C)





Discharge Summary


Admission Date:


Mar 26, 2017 at 01:30


Discharge Date:  Mar 31, 2017


Discharge Disposition:  Home


Principal Diagnosis:  Acute gallstone pancreatitis, acute cholecystitis


Problems/Secondary Diagnoses:


Sickle cell anemia


Procedures:














Patient:  SHASHI BRYANT Admit Date: 03/25/1703/25/17


 


Med Rec:  [id number] Acct ID:  [id number]


 

















 [~ rep ct labl] Page 2of 2 p: [~ rep prt dt last] [~ rep prt tm last]





 








   











 [~ rep ct labl] Page 1of 1 p: [~ rep prt dt last] [~ rep prt tm last]


 














  OPERATIVE REPORT


 


 


 


 [place], [place]


 


 


 


Patient:  SHASHI BRYANT Admit Date: 03/25/1703/26/17


 


Med Rec:  [id number] Att Phy:  Jhon Riley MD


 


Acct ID:  [id number] Fam Phy:  [facility]


 


YOB: 1995 Ref Phy:  [facility]


 


Age:  21 Location: [facility]


 


Sex:   Room/Bed:  [facility]


 


MNE:[facility] REPORT #:  [id number]


 


CC: Rustam Cleary M.D.


 


 Endcc:








DICTATED BY:  Rustam Cleary M.D.


DATE OF OPERATION:  03/30/2017


 


PREOPERATIVE DIAGNOSIS:  Cholelithiasis, gallbladder sludge with history of


pancreatitis.


 


POSTOPERATIVE DIAGNOSIS:  Same.


 


PROCEDURE:  Laparoscopic cholecystectomy with intraoperative cholangiogram.


 


SURGEON:  Dr. Cleary.


 


ASSISTANT:  Gail Avila PA-C.


 


FINDINGS:  The gallbladder was of normal size.  The cystic duct was not


dilated.  The liver was of normal size and contour.  The visible bowel


appeared normal.  The cholangiogram was performed.  There was free flow into


the duodenum.  There was flow into the radicles.  There was no evidence of


filling defect.


 


TECHNIQUE:  The patient was given a general anesthetic and the area was


prepped and draped in the usual sterile fashion.  Transverse incision was


made below the umbilicus, carried down through the subcutaneous tissue to the


fascia which was grasped with 2 Kocher clamps and incised between.  The


peritoneum was identified, incised, and the introducer was placed bluntly. 


The abdomen was then insufflated to a pressure of 15 mmHg with carbon


dioxide.  The upper midline, mid clavicular, and anterior axillary


introducers were placed under direct vision through small skin incisions. 


Traction was placed on the gallbladder, and beginning on the lateral side of


the infundibulum, the peritoneum was opened and peeled down toward the common


bile duct.  The infundibulum was dissected away from the liver on that side. 


Further dissection was carried similarly on the anterior surface of the


infundibulum and into the triangle of Calot which was opened.  The


infundibulum was dissected away from the infundibulum on the medial side. 


Further dissection of connective tissue and lymphatics were peeled toward the


common bile duct and the cystic duct was identified.  A plane was able to be


established behind the cystic duct by performing further dissection.  The


cystic duct-gallbladder junction was confirmed.  A clip was placed near the


cystic duct-gallbladder junction and the cystic duct was partially


transected.  The cholangiocatheter was placed in the duct and the balloon was


inflated.  It was irrigated, there was no leak.  Cholangiogram was performed


with findings as above.  Cholangiocatheter was removed and 3 clips were


placed in the proximal cystic duct and transection was completed.  That


allowed me to visualize easily the cystic artery, isolated, clamped twice


proximally and once near the gallbladder and divided.  The gallbladder was


peeled off the liver bed, and in doing so, there was a posterior branch of


the artery that was identified in 2 places.  It was clamped, divided, and the


division of the attachments of the gallbladder to the liver were taken down


and the gallbladder was freed.  It was placed into an Endobag and brought out


through the upper midline incision.  The introducer was replaced and the


liver edge was elevated.  The subdiaphragmatic and subhepatic spaces were


irrigated and the irrigation removed and that was repeated until the return


was clear.  The gallbladder bed of the liver was inspected and there was no


bleeding.  The previously placed clips were inspected and were intact.  The


gas was allowed to escape and the introducers were removed.  The fascia of


the umbilical introducer site and the upper midline introducer sites were


closed with interrupted 0 Vicryl and skin of all the incisions was closed


with 4-0 Monocryl in either an interrupted or running subcuticular fashion. 


The skin was cleansed, dried, benzoin placed and Steri-Strips applied.  The


estimated blood loss was 5 mL.  Sponge, needle and instrument counts were


correct prior to closure.  The patient tolerated surgical procedure without


complication and was transferred to recovery.


 


 


I attest to the content of the Intraoperative Record and any orders documented 

therein. Any exceptions are noted below.


 


 


 











 Dictated:  03/30/17 2310 


 


Transcribed:  03/31/17 0134 <Electronically signed by Rustam Cleary M.D.>


 


Signed:  03/31/17 0836 _________________________________________


 


ES Rustam Cleary M.D.














 The status of this report is Signed.


 


Draft = Not yet reviewed or approved by Medical Physician.


 


Signed = Reviewed and approved by Medical Physician.








Consultations:


General surgery--Dr. Cleary


Hematology--Dr. Diaz/Dr. Sosa


Gastroenterology--Dr. Maria


 (Cher Rowley, PA-C)


Problems/Secondary Diagnoses:


hypomagnesemia - resolved


abnormal LFTs 2nd to acute cholecystitis - resolving


h/o splenic infarct


Procedures:


MRCP


gall bladder ultrasound 


 (Jhon Riley MD)





Medication Reconciliation


Changed Medications:  


Oxycodone/Acetaminophen 5MG/325MG (Oxycodone/Acetaminophen 5MG/325MG) 1 Tab Tab


1 TABLET PO Q6H PRN for Pain for 3 Days, #9 TAB (Changed from: Oxycodone/

Acetaminophen 10MG/325MG (Percocet 10MG/325MG)  Tab 1 Tab PO AS DIRECTED PRN 

Pain 10 Days #20 TAB NS)


Take one tablet by mouth every 6 hours as needed for pain


 


Continued Medications:  


Hydroxyurea (Hydrea Cap) 500 Mg Cap


500 MG PO DAILY for 30 Days, #30 CAP NS





Ondansetron Hcl (Zofran) 4 Mg Tab


4 MG PO Q6H PRN for Nausea, TAB











Referrals At Discharge


Follow up Referrals:  


Oncology/Hematology Referral - Within 2 Weeks with Wale Diaz D.O.


Surgery Referral - Within 2 Weeks with Rustam Cleary M.D.








Discharge Exam


Patient is doing well postoperatively.  He currently complains of a mild 2/10 

sore, aching pain in his right flank.  He denies any nausea or vomiting.  He is 

tolerating a low-fat diet well.  The patient denies fevers, chills, sweats, 

chest pain, palpitations, claudication, cough, wheezing, shortness of breath, 

nausea, vomiting, dysuria, hematuria, urinary retention, paralysis, weakness, 

numbness and tingling.


Review of Systems:  


   Constitutional:  + fatigue, No chills, No fever, No sweats


   Eyes:  No diplopia, No eye pain, No worsening of vision


   ENT:  No hearing loss, No sore throat, No trouble swallowing


   Respiratory:  No cough, No shortness of breath, No wheezing


   Cardiovascular:  No chest pain, No claudication, No palpitations


   Abdomen:  + pain, No nausea, No vomiting


   Musculoskeletal:  No calf pain, No joint pain, No muscle pain


   Genitourinary - Male:  No dysuria, No hematuria, No urinary retention


   Neurologic:  No numbness/tingling, No paralysis, No weakness


   Integumentary:  No color change, No itch, No rash


Physical Exam:  


   General Appearance:  WD/WN, no apparent distress, + thin


   Eyes:  normal inspection, PERRL, EOMI


   ENT:  normal ENT inspection, hearing grossly normal, pharynx normal


   Neck:  supple, no JVD, trachea midline


   Respiratory/Chest:  lungs clear, normal breath sounds, no respiratory 

distress


   Cardiovascular:  regular rate, rhythm, no gallop, no murmur


   Abdomen / GI:  normal bowel sounds, soft, + tenderness (R flank and RUQ TTP)

, + pertinent finding (lap marisabel incisions covered with steri strips.  wounds 

clean)


   Extremities:  no calf tenderness, normal capillary refill, no pedal edema


   Neurologic/Psychiatric:  alert, normal mood/affect, oriented x 3


   Skin:  normal color, warm/dry, no rash


 (Cher Rowley ., PA-C)





Hospital Course


20 y/o male with a history of sickle cell anemia presents to the ED on 3/25 

with abdominal pain, nausea, and vomiting.





Acute gallstone pancreatitis/acute cholecystitis--improving.  Pt met sepsis 

criteria on admission with tachycardia, WBC >12K and source of infection


-Admit to [facility]


-Hypoglycemia resolved,  on 3/29.  IV fluids changed to Lactated Ringer'

s at 100 cc/hr


-GI consulted, appreciate recs:  Start clear liquid diet.  Continue IVF at 250 

cc/hr until 3/28.  GI signed off 3/27.


-Consult general surgery for cholecystectomy evaluation, appreciate recs:  

Laparoscopic cholecystectomy with cholangiogram on 3/30.  Patient tolerated 

well.  Cholangiogram normal.  Spoke with Dr. Cleary, patient is cleared for 

discharge if able to tolerate low-fat diet.


-Tolerating low-fat diet on discharge


-D/C IV Zosyn


-Dilaudid changed to morphine per patient request


-Continue nausea control with IV Zofran and Phenergan


-Protonix 40 mg IV qd


-LFTs improved.  Total bilirubin 2.9 on discharge, improved from 3.5.  AST and 

ALT within normal limits.


-Lipase 491 on discharge, improved from 745.


-Discharged with Percocet 5/325 1 tablet q6h prn pain x 3 days.  May continue 

home Zofran as needed for nausea





Hypomagnesemia--resolved


-Magnesium 1.4 on 3/27


-Given magnesium sulfate 1 g IV 1


-Magnesium 1.7 on 3/28


-Second bag of magnesium sulfate 1 g IV ordered, however, patient reported 

burning during infusion and did not complete the bag.  Patient was given oral 

supplementation instead.


-Magnesium remains at 1.7 on 3/29


-Magnesium oxide 400 mg PO TID


-Repeat magnesium on 3/30 at 2.0





Sickle cell anemia--stable


-Hgb 9.5 on discharge


-Hematology consulted, appreciate recs:  Spoke with Dr. Sosa, patient may 

resume hydroxyurea





DVT prophylaxis


-SCDs





Code Status


-Level I, FULL RESUSCITATION STATUS





Dispo


-Patient medically stable for discharge, will return home








This chart was completed in part utilizing Dragon Speech Voice Recognition 

software. Attempts were made to minimize the grammatical errors, random word 

insertions, pronoun errors and incomplete sentences. Any formal questions or 

concerns about the content, text or information contained within the body of 

this dictation should be directly addressed to the provider for clarification.


Total Time Spent:  Greater than 30 minutes


This includes examination of the patient, discharge planning, medication 

reconciliation, and communication with other providers.


 (Cher Rowley ., STERLING)


Attending Attestation & Discharge Note: 





Pt seen/examined, chart reviewed, and care plan d/w LAMONTE Rowley on day of 

discharge.


I agree with the key components of her discharge summary.  





22yo male with known sickle cell disease who presented with abdominal pain and 

markedly elevated LFTs.


Found to have gallstone pancreatitis.  


Seen in consult by gastroenterology, heme/onc, and general surgery.


Was treated medically for the pancreatitis in customary fashion, and after 

several days of recovery, underwent an uncomplicated 


laparoscopic cholecystectomy 


Post-op his LFTs and lipase remained stable. 


Intra-op cholangiogram failed to show a CBD stone. 





From a hematological standpoint he was stable while here with Hb 9-10 range and 

no sickle crisis. 





He will follow-up with [facility], Dr. Cleary, and the heme/

onc clinic after discharge.





Discharge exam -


gen - nad


eyes - scant icterus


mouth - MMM


heart - RRR, s1, s2


lungs - CTA b/l 


abd - soft, ND, BS+, minimal tenderness


skin - surgical sites on abd wall clean


ext - no edema





Jhon Riley MD


Total Time Spent:  Less than 30 minutes


 (Jhon Riley MD)


Discharge Instructions


Please refer to the electronic Patient Visit Report (Discharge Instructions) 

for additional information.


 (Cher Rowley ., PA-C)





Follow-Up


1.  [facility] on Wed April 4th at 2:15


2.  Rustam Cleary MD - [facility] general surgery - within 2 weeks


3.  [facility] [place] - Dr. Diaz or Dr. Sosa - within 2 weeks 


 (Jhon Riley MD)





Additional Copies To


Rustam Cleary M.D.; Wale Diaz D.O.; Devan Sosa D.O.; 

[facility]

## 2017-03-31 NOTE — DISCHARGE INSTRUCTIONS
Discharge Instructions


Date of Service


Mar 31, 2017.





Admission


Reason for Admission:  Acute Cholecystitis, Acute Gallstone Pancreatitis





Discharge


Discharge Diagnosis / Problem:  Acute cholecystitis, acute gallstone 

pancreatitis





Discharge Goals


Goal(s):  Decrease discomfort, Improve function, Improve disease control, 

Diagnostic testing, Therapeutic intervention





Activity Recommendations


Activity Limitations:  per Instructions/Follow-up section





.





Instructions / Follow-Up


Instructions / Follow-Up


You were admitted to the hospital with acute pancreatitis secondary to 

gallstones, as well as acute cholecystitis.  You were treated by being kept 

nothing by mouth and given aggressive IV fluids.  Once your acute pancreatitis 

resolved, you were taken to the OR for a laparoscopic cholecystectomy and 

cholangiogram.  Your surgery revealed a normal-sized gall bladder and liver.  

The cholangiogram was also normal.  You tolerated the procedure well and are 

now medically stable for discharge.





                                                              Post-Surgical ~

Discharge Instructions 





 Activity Recommendations:





- lifting limitation:                       no more than 10 pounds for 2 weeks 


- exercise/sex/sports limit:       non-strenuous for 2 weeks


- driving or machine use limit:  none for 1 week


- Shower/bathe limit:                 may shower beginning tomorrow





Diet: 





- Low fat diet until follow up with Dr. Cleary in 2 weeks





SPECIAL CARE INSTRUCTIONS:





- May shower in 24 hours. Let water run over area and pat dry.





- Leave steri strips on for one week.





- Call the surgeon's office with any questions or concerns - [phone]    (

e.g. temperature higher than 101 degrees F, excessive bleeding or pain).





MEDICATIONS:





-You may continue your hydroxyurea 500 mg by mouth daily per Dr. Sosa of 

hematology





-Ibuprofen 600 mg every 6 hours with food





- Percocet 1 tablet every 6 hours as needed for pain





FOLLOW UP VISIT:





-  If not already scheduled, please call the office to schedule a two week 

follow-up appointment.


    Office number [phone]





-- A referral has been placed for you to follow up with Dr. Diaz in 2 weeks.





Current Hospital Diet


Patient's current hospital diet: Low Fat Diet





Discharge Diet


Recommended Diet:  Low Fat Diet





Procedures


Procedures Performed:  


Laparoscopic Cholecystectomy with Cholangiogram





Pending Studies


Studies pending at discharge:  no





Medical Emergencies








.


Who to Call and When:





Medical Emergencies:  If at any time you feel your situation is an emergency, 

please call 911 immediately.





.





Non-Emergent Contact


Non-Emergency issues call your:  Primary Care Provider


Call Non-Emergent contact if:  you have a fever, your pain is not controlled, 

your pain is worsening, your pain is unusual for you, your pain is concerning 

you, wound has increased drainage, wound has increased redness, wound has 

increased pain, you have any medication questions





.





Past History


Medical & Surgical History:  


(1) Acute gallstone pancreatitis


(2) Acute cholecystitis


.





"Provider Documentation" section prepared by Cher Rowley.














Attending Attestation:


Pt seen & examined on day of discharge and care plan d/w PA Cher Rowley.


I agree w/ her discharge instructions as outlined. 





Jhon Riley MD





VTE Core Measure


Inpt VTE Proph given/why not?:  SCD's





[place] Drug Monitoring Program


Search Results:  patient reviewed within database, no issues identified

## 2018-02-14 ENCOUNTER — HOSPITAL ENCOUNTER (EMERGENCY)
Dept: HOSPITAL 45 - C.EDB | Age: 23
Discharge: HOME | End: 2018-02-14
Payer: COMMERCIAL

## 2018-02-14 VITALS — TEMPERATURE: 98.24 F

## 2018-02-14 VITALS — OXYGEN SATURATION: 97 % | HEART RATE: 94 BPM | DIASTOLIC BLOOD PRESSURE: 64 MMHG | SYSTOLIC BLOOD PRESSURE: 112 MMHG

## 2018-02-14 VITALS
HEIGHT: 65 IN | HEIGHT: 65 IN | BODY MASS INDEX: 17.04 KG/M2 | BODY MASS INDEX: 17.04 KG/M2 | WEIGHT: 102.29 LBS | WEIGHT: 102.29 LBS

## 2018-02-14 DIAGNOSIS — R17: ICD-10-CM

## 2018-02-14 DIAGNOSIS — R10.12: Primary | ICD-10-CM

## 2018-02-14 DIAGNOSIS — Z90.49: ICD-10-CM

## 2018-02-14 DIAGNOSIS — D57.1: ICD-10-CM

## 2018-02-14 DIAGNOSIS — R03.0: ICD-10-CM

## 2018-02-14 DIAGNOSIS — Z82.49: ICD-10-CM

## 2018-02-14 DIAGNOSIS — Z83.3: ICD-10-CM

## 2018-02-14 DIAGNOSIS — Z84.1: ICD-10-CM

## 2018-02-14 LAB
ALBUMIN SERPL-MCNC: 4.5 GM/DL (ref 3.4–5)
ALP SERPL-CCNC: 57 U/L (ref 45–117)
ALT SERPL-CCNC: 58 U/L (ref 12–78)
AST SERPL-CCNC: 73 U/L (ref 15–37)
BASOPHILS # BLD: 0.04 K/UL (ref 0–0.2)
BASOPHILS NFR BLD: 0.3 %
BUN SERPL-MCNC: 13 MG/DL (ref 7–18)
CALCIUM SERPL-MCNC: 8.7 MG/DL (ref 8.5–10.1)
CO2 SERPL-SCNC: 21 MMOL/L (ref 21–32)
CREAT SERPL-MCNC: 0.72 MG/DL (ref 0.6–1.4)
EOS ABS #: 0.06 K/UL (ref 0–0.5)
EOSINOPHIL NFR BLD AUTO: 133 K/UL (ref 130–400)
GLUCOSE SERPL-MCNC: 82 MG/DL (ref 70–99)
HCT VFR BLD CALC: 29.9 % (ref 42–52)
HGB BLD-MCNC: 11 G/DL (ref 14–18)
IG#: 0.03 K/UL (ref 0–0.02)
IMM GRANULOCYTES NFR BLD AUTO: 21.7 %
LIPASE: 79 U/L (ref 73–393)
LYMPHOCYTES # BLD: 2.93 K/UL (ref 1.2–3.4)
MCH RBC QN AUTO: 27.4 PG (ref 25–34)
MCHC RBC AUTO-ENTMCNC: 36.8 G/DL (ref 32–36)
MCV RBC AUTO: 74.6 FL (ref 80–100)
MONO ABS #: 1.27 K/UL (ref 0.11–0.59)
MONOCYTES NFR BLD: 9.4 %
NEUT ABS #: 9.2 K/UL (ref 1.4–6.5)
NEUTROPHILS # BLD AUTO: 0.4 %
NEUTROPHILS NFR BLD AUTO: 68 %
POTASSIUM SERPL-SCNC: 3.6 MMOL/L (ref 3.5–5.1)
PROT SERPL-MCNC: 8.5 GM/DL (ref 6.4–8.2)
RED CELL DISTRIBUTION WIDTH CV: 15.5 % (ref 11.5–14.5)
RED CELL DISTRIBUTION WIDTH SD: 41.8 FL (ref 36.4–46.3)
RETIC COUNT %: 1.4 % (ref 0.5–2)
SODIUM SERPL-SCNC: 134 MMOL/L (ref 136–145)
WBC # BLD AUTO: 13.53 K/UL (ref 4.8–10.8)

## 2018-02-14 NOTE — DIAGNOSTIC IMAGING REPORT
ABDOMEN AND PELVIS CT WITH IV CONTRAST



CT DOSE: 263.26 mGy.cm



HISTORY: Acute severe left upper quadrant abdominal pain with history of sickle

cell disease and recent splenic infarction  severe left abd pain, SS, hx splenic

infarct



TECHNIQUE: Multiaxial CT images of the abdomen and pelvis were performed

following the use of intravenous contrast.  A dose lowering technique was

utilized adhering to the principles of ALARA.



COMPARISON STUDY: Splenic ultrasound of same day, CT and ultrasound 1/8/2017 and

3/23/2017.



FINDINGS: 

Lung bases are generally clear. There is no pneumatosis or pneumoperitoneum

identified. Imaged inferior cardiac chambers are unremarkable.



Prior cholecystectomy. Mild periportal edema likely secondary to overhydration.

Liver is otherwise unremarkable. Pancreas and adrenal glands are within normal

limits. There is improved appearance of the spleen from comparison study

3/23/2017 which again appears heterogeneous with decreased size of the splenic

infarctions. Spleen is decreased in size, now measuring 11 cm, previously 13 cm

in length. Decreased enhancement within the inferior medial portions of the

spleen again noted compatible with areas of infarction.



Kidneys, ureters and urinary bladder are unremarkable. Aorta is normal in both

course and caliber. No bulky adenopathy. Unchanged mildly prominent mesenteric

lymph nodes are seen throughout.



Fluid-filled nondilated loops of small bowel are likely physiologic. No bowel

obstruction or focal bowel wall thickening. The appendix appears unchanged from

comparison study and is nondilated within the right lower quadrant. Soft tissues

are unremarkable. Bones appear intact.



IMPRESSION: 



1. Improved appearance of the spleen which has slightly decreased in size from

most recent comparison study. Areas of prior splenic infarction have also

decreased in size.

2. Prior cholecystectomy.

3. Unchanged mild mesenteric adenopathy.

4. No bowel obstruction or focal bowel wall thickening.







Electronically signed by:  Reginaldo Quarles M.D.

2/14/2018 7:36 AM



Dictated Date/Time:  2/14/2018 7:29 AM

## 2018-02-14 NOTE — EMERGENCY ROOM VISIT NOTE
History


First contact with patient:  00:46


Chief Complaint:  ABDOMINAL PAIN


Stated Complaint:  PAIN OVER LEFT SIDE OF STOMACH


Nursing Triage Summary:  


pt reports that he woke up at 2300 with LUQ pain. denies N/V/D. hx sickle cell 


and spleen infarct.





History of Present Illness


The patient is a 22 year old male who presents to the Emergency Room with 

complaints of severe left upper quadrant pain for the past few hours as a 

history of sickle cell and splenic infarct.  Symptoms were similar.  He follows 

with Dr. Diaz.  He describes the pain as severe, 9 out of 10.  Nothing makes 

it better or worse.  Patient denies chest pain, dyspnea, fever, chills, nausea, 

vomiting, diarrhea, back pain, urinary symptoms.  Patient has had his 

gallbladder out already.





Review of Systems


An 10 system review of systems was completed with positives and pertinent 

negatives listed in the HPI.





Past Medical/Surgical History


Medical Problems:


(1) Acute cholecystitis


(2) Acute gallstone pancreatitis


(3) Elevated bilirubin


(4) Intractable abdominal pain


(5) Sickle cell anemia


(6) Sickle cell anemia


(7) Sickle cell crisis


(8) Splenic infarct








Family History





Diabetes mellitus


FH: heart disease


Hypertension


Kidney disease





Social History


Smoking Status:  Never Smoker


Alcohol Use:  none


Drug Use:  none


Marital Status:  single


Occupation Status:  [employer] student





Current/Historical Medications


No Active Prescriptions or Reported Meds





Physical Exam


Vital Signs











  Date Time  Temp Pulse Resp B/P (MAP) Pulse Ox O2 Delivery O2 Flow Rate FiO2


 


2/14/18 05:20  104 16 114/60 98 Room Air  


 


2/14/18 03:35  96 16 106/56 95 Room Air  


 


2/14/18 02:32  96 20 119/72 100 Room Air  


 


2/14/18 00:41 36.8 117 18 142/79 100 Room Air  











Physical Exam


VITALS: Vitals are noted on the nurse's note and reviewed by myself.  Vital 

signs stable.


GENERAL: Pleasant male who appears in pain, in no acute distress, nondiaphoretic

, well-developed well-nourished.


SKIN: The skin was without rashes, erythema, edema, or bruising.  There is no 

tenting of the skin.  Capillary reflex less than 2 seconds.


HEAD: Normocephalic atraumatic.  


EARS: External auditory canals clear, tympanic membranes pearly gray without 

erythema or effusion bilaterally.


EYES: Pupils equal round and reactive to light and accommodation.  Conjunctivae 

without injection, sclerae without icterus.  Extraocular movements intact.  


NOSE: Patent, turbinates without inflammation or discharge.   


MOUTH: Mucous membranes moist. .  Pharynx without erythema or exudate.  Uvula 

midline.  Airway patent.  Tongue does not deviate.  


NECK: Supple without nuchal rigidity.  No lymphadenopathy.  No thyromegaly.  

Cervical spine is nontender.  No JVD.


HEART: Regular rate and rhythm without murmurs gallops or rubs.


LUNGS: Clear to auscultation bilaterally without wheezes, rales or rhonchi.  No 

dullness to percussion.  No retractions or accessory muscle use.


ABDOMEN: Positive bowel sounds x 4.  Normal tympanic percussion.  Soft, tender 

to palpation left upper quadrant, no CVA tenderness, without masses or 

organomegaly.  Butts sign negative.  No guarding or rebound tenderness.


MUSCULOSKELETAL: No muscle atrophy, erythema, or edema noted.  


NEURO: Patient was alert and oriented to person place and time.    No focal 

neurological deficits.





Medical Decision & Procedures


Laboratory Results


2/14/18 01:05








Red Blood Count 4.01, Mean Corpuscular Volume 74.6, Mean Corpuscular Hemoglobin 

27.4, Mean Corpuscular Hemoglobin Concent 36.8, Neutrophils (%) (Auto) 68.0, 

Lymphocytes (%) (Auto) 21.7, Monocytes (%) (Auto) 9.4, Eosinophils (%) (Auto) 

0.4, Basophils (%) (Auto) 0.3, Neutrophils # (Auto) 9.20, Lymphocytes # (Auto) 

2.93, Monocytes # (Auto) 1.27, Eosinophils # (Auto) 0.06, Basophils # (Auto) 

0.04





2/14/18 01:05

















Test


  2/14/18


01:05


 


White Blood Count


  13.53 K/uL


(4.8-10.8)


 


Red Blood Count


  4.01 M/uL


(4.7-6.1)


 


Hemoglobin


  11.0 g/dL


(14.0-18.0)


 


Hematocrit 29.9 % (42-52) 


 


Mean Corpuscular Volume


  74.6 fL


()


 


Mean Corpuscular Hemoglobin


  27.4 pg


(25-34)


 


Mean Corpuscular Hemoglobin


Concent 36.8 g/dl


(32-36)


 


Platelet Count


  133 K/uL


(130-400)


 


Neutrophils (%) (Auto) 68.0 % 


 


Lymphocytes (%) (Auto) 21.7 % 


 


Monocytes (%) (Auto) 9.4 % 


 


Eosinophils (%) (Auto) 0.4 % 


 


Basophils (%) (Auto) 0.3 % 


 


Neutrophils # (Auto)


  9.20 K/uL


(1.4-6.5)


 


Lymphocytes # (Auto)


  2.93 K/uL


(1.2-3.4)


 


Monocytes # (Auto)


  1.27 K/uL


(0.11-0.59)


 


Eosinophils # (Auto)


  0.06 K/uL


(0-0.5)


 


Basophils # (Auto)


  0.04 K/uL


(0-0.2)


 


RDW Standard Deviation


  41.8 fL


(36.4-46.3)


 


RDW Coefficient of Variation


  15.5 %


(11.5-14.5)


 


Immature Granulocyte % (Auto) 0.2 % 


 


Immature Granulocyte # (Auto)


  0.03 K/uL


(0.00-0.02)


 


Platelet Estimate NORMAL 


 


Target Cells 1+ 


 


Absolute Reticulocyte Count


  0.05 10^6/uL


(0.02-0.10)


 


Percent Reticulocyte Count


  1.4 %


(0.5-2.0)


 


Immature Reticulocyte Fraction


  13.4 %


(2.3-13.4)


 


Reticulocyte Hemoglobin


Content 31.2 PG


(28.2-36.6)


 


Urine Color DK YELLOW 


 


Urine Appearance CLEAR (CLEAR) 


 


Urine pH 5.0 (4.5-7.5) 


 


Urine Specific Gravity


  1.015


(1.000-1.030)


 


Urine Protein NEG (NEG) 


 


Urine Glucose (UA) NEG (NEG) 


 


Urine Ketones TRACE (NEG) 


 


Urine Occult Blood NEG (NEG) 


 


Urine Nitrite NEG (NEG) 


 


Urine Bilirubin NEG (NEG) 


 


Urine Urobilinogen NEG (NEG) 


 


Urine Leukocyte Esterase NEG (NEG) 


 


Anion Gap


  12.0 mmol/L


(3-11)


 


Est Creatinine Clear Calc


Drug Dose 107.4 ml/min 


 


 


Estimated GFR (


American) > 150.0 


 


 


Estimated GFR (Non-


American 134.3 


 


 


BUN/Creatinine Ratio 18.3 (10-20) 


 


Calcium Level


  8.7 mg/dl


(8.5-10.1)


 


Total Bilirubin


  2.7 mg/dl


(0.2-1)


 


Direct Bilirubin


  0.4 mg/dl


(0-0.2)


 


Aspartate Amino Transf


(AST/SGOT) 73 U/L (15-37) 


 


 


Alanine Aminotransferase


(ALT/SGPT) 58 U/L (12-78) 


 


 


Alkaline Phosphatase


  57 U/L


()


 


Total Protein


  8.5 gm/dl


(6.4-8.2)


 


Albumin


  4.5 gm/dl


(3.4-5.0)


 


Lipase


  79 U/L


()











Medications Administered











 Medications


  (Trade)  Dose


 Ordered  Sig/Nicolas


 Route  Start Time


 Stop Time Status Last Admin


Dose Admin


 


 Sodium Chloride  1,000 ml @ 


 999 mls/hr  Q1H1M STAT


 IV  2/14/18 00:51


 2/14/18 01:55 DC 2/14/18 01:09


999 MLS/HR


 


 Hydromorphone HCl


  (Dilaudid Inj)  1 mg  NOW  STAT


 IV  2/14/18 00:51


 2/14/18 00:54 DC 2/14/18 01:10


1 MG


 


 Ondansetron HCl


  (Zofran Inj)  4 mg  NOW  STAT


 IV  2/14/18 00:51


 2/14/18 00:54 DC 2/14/18 01:09


4 MG


 


 Hydromorphone HCl


  (Dilaudid Inj)  1 mg  NOW  STAT


 IV  2/14/18 02:26


 2/14/18 02:27 DC 2/14/18 02:35


1 MG











ED Course


Prior records/ancillary studies reviewed.


Triage Nursing notes reviewed.


 


The patient's history was concerning for abdominal pain. 





Differential diagnosis:


Etiologies such as sickle cell crisis, splenic infarct, appendicitis, 

diverticulitis, PUD, UTI, pancreatitis, obstruction, mesenteric ischemia, 

aortic pathology, infections, inflammatory bowel disease, renal colic, as well 

as others were entertained. 





Physical examination findings:


As above.





ER treatment provided:


Dilaudid, Zofran, IV fluids


On reassessment the patient felt better.





Diagnostics interpreted by me:


 


The labs revealed mild anemia.  Normal retic count; chronically elevated 

bilirubin





Imaging studies:


CT ABDOMEN & PELVIS With Contrast:


Comparison: 3/23/2017.


Lower thorax is unremarkable.


Gallbladder is surgically absent.


Heterogeneous attenuation of the spleen with present around infarcts noted.


Pancreas and adrenal glands are unremarkable.


Kidneys, ureters and urinary bladder are unremarkable.


Bowel is unremarkable.


Appendix is not visualized.


No acute osseous abnormality.


Radiologist: Efren Nation MD





Consultation:


A consultation was placed with the hospitalist, Dr. Hull. The case was 

discussed and diagnostics were reviewed. The patient was evaluated in the ER 

for further treatment. 








Exam and history seem consistent with splenic infarct.  Patient had a mild 

anemia improving per chart review.  Chronically elevated bilirubin.  Patient 

refused admission.  He states he would rather go home and follow-up outpatient 

with hematology.  Patient was informed that a splenic infarct.  He has had this 

before.  He understands the risks involved such as further injury to his spleen 

or blood clots and verbalized understanding of this.  He was strongly 

encouraged to follow-up within a day or 2 with his hematologist or with family 

care for reevaluation or here in the ER sooner for further evaluation treatment

, fevers, pain, worsening signs or symptoms or as needed.  Patient ambulated 

out of the ER without difficulties.


By the evaluation outlined above emergent etiologies such as appendicitis, 

diverticulitis, PUD, biliary pathology, UTI, pancreatitis, obstruction, 

mesenteric ischemia, aortic pathology, infections, inflammatory bowel disease, 

renal colic, as well as others were deemed relatively unlikely. 





The pt informed about the findings as listed above. All questions were answered 

and  pleased with the treatment. Return instructions were outlined and the 

patient was discharged in stable condition. 





Outpatient prescription management:


OxyIR, Zofran





Referral:


The patient was referred back to their primary care physician for follow-up in 

2 to 3 days for a recheck of the current condition.








Case reviewed with my attending





Medical Decision


As above





[place] Drug Monitoring [place]


Search Results:  patient reviewed within database, no issues identified





Medication Reconcilliation


Current Medication List:  was personally reviewed by me





Blood Pressure Screening


Patient's blood pressure:  Elevated blood pressure


Blood pressure disposition:  Elevated BP felt to be situational





Impression





 Primary Impression:  


 Splenic infarct





Departure Information


Dispostion


Home / Self-Care





Condition


FAIR





Prescriptions





No Active Prescriptions or Reported Meds





Referrals


Wale Diaz D.O. (PCP)





Patient Instructions


[facility]





Additional Instructions





DO NOT drive, drink alcohol, operate machinery, or perform dangerous activities 

today.  You were given medications in the ER that can affect your ability to 

safely function or operate a vehicle.





Oxycodone (OxyIR) 5mg: Take 1-2 pills every four hours for breakthrough pain.  

Avoid alcohol, operating machinery or dangerous equipment, working on ladders 

or roofs, DRIVING, or situations where being under the influence may be 

dangerous.  It is recommended to use an over-the-counter stool softener such as 

Colace, 100mg twice daily while taking this medication to avoid constipation. 





Ibuprofen(Motrin, Advil) may be used for fever or pain.  Use 600mg every six 

hours as needed.  Take with food.  Avoid using more than 2400mg in a 24 hour 

period.  Do not use 2400mg per day for more than three consecutive days without 

physician direction.  Prolonged inappropriate use can lead to stomach upset or 

ulcers.  This medication can be taken if you need to drive, work, or perform 

activities which may be dangerous when taking narcotic pain medication.


(AND/OR)


Acetaminophen(Tylenol) may be used for fever or pain.  Use 1000mg every six 

hours as needed.  Avoid using more than 3000mg in a 24 hour period.  This 

medication can be taken if you need to drive, work, or perform activities which 

may be dangerous when taking narcotic pain medication.





Zofran 4mg:  Take one every six hours as needed for nausea. Avoid alcohol, 

operating machinery or dangerous equipment, working on ladders or roofs, DRIVING

, or situations where being under the influence may be dangerous.





Rest and drink plenty of fluids as tolerated. 





Continue current medications.





Call your hematologist today to make a follow-up appointment with the next few 

days.





Return to the ER immediately for worsening or persistent abdominal pain, 

vomiting, fevers, chest pains, difficulty breathing, black or bloody stools, 

worsening of your condition, or as needed.





Follow up with your primary physician in 1-2 days for a recheck of your current 

condition.

## 2018-02-14 NOTE — DIAGNOSTIC IMAGING REPORT
(SPLEEN) ABD LTD



HISTORY:  22 years-old Male LUQ, SS pt, hx splenic infarct acute left upper

quadrant abdominal pain. History of sickle cell.



COMPARISON: CT abdomen and pelvis of same day and also 1/8/2017, ultrasound

1/8/2017



TECHNIQUE: Multiple real-time sonographic images of the spleen were obtained

assessing grayscale appearance and color flow



FINDINGS: 

There is improved sonographic appearance of the spleen from comparison studies

dated 1/8/2017. The spleen is heterogeneous throughout and demonstrates mild

perisplenic fluid. Focal area of decreased echogenicity are seen near the

superior and inferior portions of the spleen measuring up to 3.7 cm suggesting

area of prior infarction. The spleen measures up to 11.2 cm in length. Splenic

artery appears patent.



No hydronephrosis of the left kidney.



IMPRESSION: Overall improved appearance of the spleen from comparison studies

dated 1/8/2017 which again appears heterogeneous with trace perisplenic fluid.

Focal areas of decreased echogenicity along the superior and inferior margins of

the spleen suggest areas of prior infarction. 







The above report was generated using voice recognition software. It may contain

grammatical, syntax or spelling errors.







Electronically signed by:  Reginaldo Quarles M.D.

2/14/2018 7:03 AM



Dictated Date/Time:  2/14/2018 6:56 AM